# Patient Record
Sex: MALE | Race: AMERICAN INDIAN OR ALASKA NATIVE
[De-identification: names, ages, dates, MRNs, and addresses within clinical notes are randomized per-mention and may not be internally consistent; named-entity substitution may affect disease eponyms.]

---

## 2019-06-30 ENCOUNTER — HOSPITAL ENCOUNTER (INPATIENT)
Dept: HOSPITAL 5 - ED | Age: 24
LOS: 3 days | Discharge: PSYCHIATRIC OR SUBSTANCE USE FACILITY | DRG: 917 | End: 2019-07-03
Attending: INTERNAL MEDICINE | Admitting: INTERNAL MEDICINE
Payer: COMMERCIAL

## 2019-06-30 DIAGNOSIS — T45.0X2A: Primary | ICD-10-CM

## 2019-06-30 DIAGNOSIS — G92: ICD-10-CM

## 2019-06-30 DIAGNOSIS — J96.01: ICD-10-CM

## 2019-06-30 DIAGNOSIS — Z88.8: ICD-10-CM

## 2019-06-30 DIAGNOSIS — E87.6: ICD-10-CM

## 2019-06-30 DIAGNOSIS — Z87.891: ICD-10-CM

## 2019-06-30 DIAGNOSIS — E87.2: ICD-10-CM

## 2019-06-30 DIAGNOSIS — T39.312A: ICD-10-CM

## 2019-06-30 DIAGNOSIS — Z91.14: ICD-10-CM

## 2019-06-30 DIAGNOSIS — F32.9: ICD-10-CM
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
## 2019-06-30 DIAGNOSIS — F15.90: ICD-10-CM

## 2019-06-30 DIAGNOSIS — F14.90: ICD-10-CM

## 2019-06-30 LAB
ALBUMIN SERPL-MCNC: 4.1 G/DL (ref 3.9–5)
ALT SERPL-CCNC: 19 UNITS/L (ref 7–56)
BACTERIA #/AREA URNS HPF: (no result) /HPF
BASOPHILS # (AUTO): 0 K/MM3 (ref 0–0.1)
BASOPHILS NFR BLD AUTO: 0.3 % (ref 0–1.8)
BENZODIAZEPINES SCREEN,URINE: (no result)
BILIRUB UR QL STRIP: (no result)
BLOOD UR QL VISUAL: (no result)
BUN SERPL-MCNC: 12 MG/DL (ref 9–20)
BUN/CREAT SERPL: 10 %
CALCIUM SERPL-MCNC: 8.6 MG/DL (ref 8.4–10.2)
EOSINOPHIL # BLD AUTO: 0.1 K/MM3 (ref 0–0.4)
EOSINOPHIL NFR BLD AUTO: 0.7 % (ref 0–4.3)
HCT VFR BLD CALC: 46.8 % (ref 35.5–45.6)
HEMOLYSIS INDEX: 138
HGB BLD-MCNC: 15.7 GM/DL (ref 11.8–15.2)
LYMPHOCYTES # BLD AUTO: 2.7 K/MM3 (ref 1.2–5.4)
LYMPHOCYTES NFR BLD AUTO: 35.2 % (ref 13.4–35)
MCHC RBC AUTO-ENTMCNC: 34 % (ref 32–34)
MCV RBC AUTO: 96 FL (ref 84–94)
METHADONE SCREEN,URINE: (no result)
MONOCYTES # (AUTO): 0.9 K/MM3 (ref 0–0.8)
MONOCYTES % (AUTO): 11.7 % (ref 0–7.3)
MUCOUS THREADS #/AREA URNS HPF: (no result) /HPF
OPIATE SCREEN,URINE: (no result)
PH UR STRIP: 6 [PH] (ref 5–7)
PLATELET # BLD: 202 K/MM3 (ref 140–440)
PROT UR STRIP-MCNC: >500 MG/DL
RBC # BLD AUTO: 4.89 M/MM3 (ref 3.65–5.03)
RBC #/AREA URNS HPF: 11 /HPF (ref 0–6)
UROBILINOGEN UR-MCNC: < 2 MG/DL (ref ?–2)
WBC #/AREA URNS HPF: 13 /HPF (ref 0–6)

## 2019-06-30 PROCEDURE — 5A1935Z RESPIRATORY VENTILATION, LESS THAN 24 CONSECUTIVE HOURS: ICD-10-PCS | Performed by: INTERNAL MEDICINE

## 2019-06-30 PROCEDURE — 80307 DRUG TEST PRSMV CHEM ANLYZR: CPT

## 2019-06-30 PROCEDURE — 94003 VENT MGMT INPAT SUBQ DAY: CPT

## 2019-06-30 PROCEDURE — 93010 ELECTROCARDIOGRAM REPORT: CPT

## 2019-06-30 PROCEDURE — 82140 ASSAY OF AMMONIA: CPT

## 2019-06-30 PROCEDURE — 94760 N-INVAS EAR/PLS OXIMETRY 1: CPT

## 2019-06-30 PROCEDURE — 36415 COLL VENOUS BLD VENIPUNCTURE: CPT

## 2019-06-30 PROCEDURE — 0BH17EZ INSERTION OF ENDOTRACHEAL AIRWAY INTO TRACHEA, VIA NATURAL OR ARTIFICIAL OPENING: ICD-10-PCS | Performed by: EMERGENCY MEDICINE

## 2019-06-30 PROCEDURE — 80320 DRUG SCREEN QUANTALCOHOLS: CPT

## 2019-06-30 PROCEDURE — 96360 HYDRATION IV INFUSION INIT: CPT

## 2019-06-30 PROCEDURE — 83735 ASSAY OF MAGNESIUM: CPT

## 2019-06-30 PROCEDURE — 36600 WITHDRAWAL OF ARTERIAL BLOOD: CPT

## 2019-06-30 PROCEDURE — G0480 DRUG TEST DEF 1-7 CLASSES: HCPCS

## 2019-06-30 PROCEDURE — 93005 ELECTROCARDIOGRAM TRACING: CPT

## 2019-06-30 PROCEDURE — 87070 CULTURE OTHR SPECIMN AEROBIC: CPT

## 2019-06-30 PROCEDURE — 71045 X-RAY EXAM CHEST 1 VIEW: CPT

## 2019-06-30 PROCEDURE — 81001 URINALYSIS AUTO W/SCOPE: CPT

## 2019-06-30 PROCEDURE — 87205 SMEAR GRAM STAIN: CPT

## 2019-06-30 PROCEDURE — 70450 CT HEAD/BRAIN W/O DYE: CPT

## 2019-06-30 PROCEDURE — 82803 BLOOD GASES ANY COMBINATION: CPT

## 2019-06-30 PROCEDURE — 94002 VENT MGMT INPAT INIT DAY: CPT

## 2019-06-30 PROCEDURE — 74018 RADEX ABDOMEN 1 VIEW: CPT

## 2019-06-30 PROCEDURE — 80053 COMPREHEN METABOLIC PANEL: CPT

## 2019-06-30 PROCEDURE — 85025 COMPLETE CBC W/AUTO DIFF WBC: CPT

## 2019-06-30 PROCEDURE — 84443 ASSAY THYROID STIM HORMONE: CPT

## 2019-06-30 PROCEDURE — 87086 URINE CULTURE/COLONY COUNT: CPT

## 2019-06-30 RX ADMIN — SODIUM CHLORIDE SCH MLS/HR: 0.9 INJECTION, SOLUTION INTRAVENOUS at 20:40

## 2019-06-30 RX ADMIN — PROPOFOL SCH MLS/HR: 10 INJECTION, EMULSION INTRAVENOUS at 19:00

## 2019-06-30 NOTE — HISTORY AND PHYSICAL REPORT
History of Present Illness


Chief complaint: 





Unresponsive


History of present illness: 


25 YO Male with Depression, Bipolar, Medication Noncompliance presents to ED for

evaluation. Pt unresponsive, intubated, and on ventilatory support at time of 

evaluation and is unable to provide history. Pt history provided by his fiyao'.

As per ashli' they have been arguing continuously over the past 1 day. Th 

patient became angry and subsequently locked himself in the bedroom and was 

heard opening bottles of pills, and then opened the door after taking an entire 

bottle of diphenhydramine 50 mg tablets as well as unknown quantity of ibuprofen

200 mg tablets.  Pt then told the fianc "I will not be here".  I will not be a 

problem for you."  EMS notified, and upon arrival the patient was found to be in

distress and transported to [facility]. Pt seen and evaluated in ED and found to have 

Encephalopathy, and Acute Respiratory Failure, and is unable to protect his 

airway. Pt intubated and placed on vent support for Acute Respiratory Failure. 

Pt admitted to ICU. Poison control notified in ED. Pt inititated on IVF 

resuscitation therapy, with serial bmp to evaluated serum creatnine. No prior 

admission for review. All listed medication reconciled at time of admission. 





Past History


Past Medical History: other (Depression,bipolar Disorder)


Past Surgical History: No surgical history, Other (reviewed)


Social history: single, lives with family.  denies: smoking, alcohol abuse, 

prescription drug abuse


Family history: no significant family history (reviewed)





Medications and Allergies


                                    Allergies











Allergy/AdvReac Type Severity Reaction Status Date / Time


 


acetaminophen AdvReac  Rash Verified 04/04/17 17:36











                                Home Medications











 Medication  Instructions  Recorded  Confirmed  Last Taken  Type


 


Cyclobenzaprine [Flexeril] 10 mg PO TID PRN #20 tablet 04/04/17  Unknown Rx


 


Hydrocodone/Ibuprofen [Reprexain 1 each PO Q8H PRN #20 tablet 04/04/17  Unknown 

Rx





 mg Tablet]     


 


Naproxen [Naprosyn] 500 mg PO BID 04/04/17 04/04/17 04/03/17 19:00 History











Active Meds: 


Active Medications





Hydrophilic Ointment (Vaseline Lip Therapy)  1 applic TP Q2HR PRN


   PRN Reason: Dry Lips


Sodium Chloride (Nacl 0.9% 1000 Ml)  1,000 mls @ 999 mls/hr IV BOLUS ONE


   Stop: 06/30/19 19:52


Sodium Chloride (Nacl 0.9% 1000 Ml)  1,000 mls @ 999 mls/hr IV BOLUS ONE


   Stop: 06/30/19 19:52


Propofol (Diprivan 10 Mg/Ml)  1,000 mg in 100 mls @ 0 mls/hr IV TITR JOSE; 

Protocol


Multi-Ingred Cream/Lotion/Oil/Oint (Artificial Tears Ophth Oint)  1 applic OU 

Q4HR PRN


   PRN Reason: Dry Eye(s)











Review of Systems


ROS unobtainable: due to endotracheal tube





Exam





- Constitutional


General appearance: Present: severe distress





- EENT


Eyes: Present: PERRL, miosis


ENT: hearing intact, clear oral mucosa





- Neck


Neck: Present: supple, normal ROM





- Respiratory


Respiratory effort: normal


Respiratory: bilateral: diminished





- Cardiovascular


Heart Sounds: Present: S1 & S2.  Absent: rub, click





- Extremities


Extremities: pulses symmetrical, No edema


Peripheral Pulses: within normal limits





- Abdominal


General gastrointestinal: Present: soft, non-tender, non-distended, normal bowel

 sounds


Male genitourinary: Present: normal





- Integumentary


Integumentary: Present: clear, warm, dry





- Musculoskeletal


Musculoskeletal: generalized weakness





- Psychiatric


Psychiatric: no appropriate mood/affect, no intact judgment & insight, no memory

 intact





- Neurologic


Neurologic: CNII-XII intact, moves all extremities, no gait normal





Results





- Labs


CBC & Chem 7: 


                                 06/30/19 18:40





                                 06/30/19 18:40


Labs: 


                              Abnormal lab results











  06/30/19 Range/Units





  18:40 


 


Hgb  15.7 H  (11.8-15.2)  gm/dl


 


Hct  46.8 H  (35.5-45.6)  %


 


MCV  96 H  (84-94)  fl


 


Lymph % (Auto)  35.2 H  (13.4-35.0)  %


 


Mono % (Auto)  11.7 H  (0.0-7.3)  %


 


Mono #  0.9 H  (0.0-0.8)  K/mm3














Assessment and Plan





- Patient Problems


(1) Respiratory failure


Status: Acute   


Qualifiers: 


   Chronicity: acute   Respiratory failure complication: hypoxia   Qualified C

ode(s): J96.01 - Acute respiratory failure with hypoxia   


Plan to address problem: 


Admit to ICU, Pulmonary consulted, wean vent as tolerated, supportive care, SBT 

in AM, sedation holiday, 





The high probability of a clinically significant, sudden or life threatening 

deterioration of the [pulmonary, cardiac, renal] system(s) required my full and 

direct attention, intervention and personal management. The aggregate critical 

care time was [65] minutes. This time is in addition to time spent performing 

reported procedures but includes the following: Additional 30 minutes spent 

discussing care plan with ashli'. 





[x] Data Review and interpretation 





[x] Patient assessment and monitoring of vital signs 





[x] Documentation 





[x] Medication orders and management








(2) Encephalopathy


Status: Acute   


Plan to address problem: 


CT head, Neuro checks, IVF resuscitation therapy. 








(3) Suicide attempt, initial encounter


Status: Acute   


Plan to address problem: 


mental health consult when awake, alert, 








(4) DVT prophylaxis


Status: Acute   


Plan to address problem: 


SCD to BLE while in bed, prophylactic lovenox

## 2019-06-30 NOTE — EMERGENCY DEPARTMENT REPORT
History of Present Illness





- General


Stated Complaint: OVERDOSE ON PAIN PILLS


Time Seen by Provider: 06/30/19 18:34





- History of Present Illness


Initial Comments: 





Peewee is a 23 yo male with history of depression and bipolar affective disorder

presents with altered mental status after intentional drug overdose.  He has 

been arguing his live-in swapna for the past day.  Today he locked himself in 

the room.  She heard him taking pills.  He opened a door after taking an entire 

bottle of diphenhydramine 50 mg tablets as well as the majority of a bottle 

ibuprofen 200 mg.  He told the ficyrus "I will not be here.  I will not be a 

problem for you."  Christine states that he has never attempted suicide.  He has 

spoken of suicide in the past.  He has good employment.  He Has been with his 

ashlie for 12 years since 7th grade.


MD Complaint: intentional overdose


-: Sudden, This afternoon (approximately 5 PM)


Intent: suicide attempt


How Overdose Was Discovered: family/friend present


Context: Intentional Overdose: relationship problems


Associated Symptoms: depression


Treatments Prior to Arrival: oxygen





- Related Data


                                Home Medications











 Medication  Instructions  Recorded  Confirmed  Last Taken


 


Naproxen [Naprosyn] 500 mg PO BID 04/04/17 04/04/17 04/03/17 19:00








                                  Previous Rx's











 Medication  Instructions  Recorded  Last Taken  Type


 


Cyclobenzaprine [Flexeril] 10 mg PO TID PRN #20 tablet 04/04/17 Unknown Rx


 


Hydrocodone/Ibuprofen [Reprexain 1 each PO Q8H PRN #20 tablet 04/04/17 Unknown 

Rx





 mg Tablet]    











                                    Allergies











Allergy/AdvReac Type Severity Reaction Status Date / Time


 


acetaminophen AdvReac  Rash Verified 04/04/17 17:36














ED Review of Systems


ROS: 


Stated complaint: OVERDOSE ON PAIN PILLS


Other details as noted in HPI





Comment: Unobtainable due to pts medical conditions (altered mental status 

obtunded)





ED Past Medical Hx





- Past Medical History


Previous Medical History?: Yes


Additional medical history: Depression bipolar disorder





- Surgical History


Additional Surgical History: Unknown





- Social History


Smoking Status: Former Smoker


Substance Use Type: None


Other Social History: 





Works as a city worker, drives street sweeping trucks





- Medications


Home Medications: 


                                Home Medications











 Medication  Instructions  Recorded  Confirmed  Last Taken  Type


 


Cyclobenzaprine [Flexeril] 10 mg PO TID PRN #20 tablet 04/04/17  Unknown Rx


 


Hydrocodone/Ibuprofen [Reprexain 1 each PO Q8H PRN #20 tablet 04/04/17  Unknown 

Rx





 mg Tablet]     


 


Naproxen [Naprosyn] 500 mg PO BID 04/04/17 04/04/17 04/03/17 19:00 History














ED Physical Exam





- General


Limitations: Altered Mental Status


General appearance: obtunded, other (GCS of 3, limp extremities, no response to 

noxious stimuli no response to sternal rub)





- Head


Head exam: Present: atraumatic, normocephalic





- Eye


Eye exam: Absent: scleral icterus, conjunctival injection


Pupils: Present: other (3 mm sluggish pupils, nystagmus)





- ENT


ENT exam: Present: mucous membranes dry





- Neck


Neck exam: Present: normal inspection





- Respiratory


Respiratory exam: Present: other (decreased respiratory effort).  Absent: 

wheezes, rales, rhonchi





- Cardiovascular


Cardiovascular Exam: Present: normal rhythm, tachycardia, normal heart sounds.  

Absent: systolic murmur, diastolic murmur





- GI/Abdominal


GI/Abdominal exam: Present: soft.  Absent: distended, tenderness, guarding





- Extremities Exam


Extremities exam: Present: normal inspection, other (no obvious deformity)





- Back Exam


Back exam: Present: normal inspection





- Neurological Exam


Neurological exam: Present: other (obtunded no purposeful movement limp 

extremities GCS of 3)





- Psychiatric


Psychiatric exam: Present: other (obtunded)





- Skin


Skin exam: Present: warm, intact, normal color





ED Course


                                   Vital Signs











  06/30/19 06/30/19 06/30/19





  18:30 18:43 18:45


 


Temperature 98.7 F  


 


Pulse Rate 112 H  110 H


 


Respiratory 30 H 24 24





Rate   


 


Blood Pressure 140/97  


 


Blood Pressure 140/97  154/99





[Right]   


 


O2 Sat by Pulse 100  100





Oximetry   














  06/30/19 06/30/19 06/30/19





  19:00 19:15 20:00


 


Temperature   


 


Pulse Rate 110 H 113 H 


 


Respiratory 24 24 21





Rate   


 


Blood Pressure  139/88 


 


Blood Pressure 166/107 140/97 





[Right]   


 


O2 Sat by Pulse 100 100 





Oximetry   














- Intubation


Time Out Performed: Yes


Sedative: Etomidate


Mg Given: 10


Paralytic: Succinylcholine


Mg Given: 120


Laryngoscope: Genevieve


Size: 4


ET Tube Size: 8


Tube Secured Depth (cm): 22


Tube Secured Location: lips


Tube Placement Confirmation: visualized tube passing t, equal breath sounds 

bilat, no breath sounds over epi, confirmation by capnometr


Patient Tolerated Procedure: well


Intubation Complications: none





ED Medical Decision Making





- Lab Data


Result diagrams: 


                                 06/30/19 18:40





                                 06/30/19 18:40





- EKG Data





06/30/19 19:35


EKG obtained 1925


Sinus tachycardia rate 105 beats a minute rightward axis prolonged QT interval 

QTc 513 ms nonspecific T wave ST pattern





- Radiology Data


Radiology results: report reviewed





Chest x-ray no acute process





- Medical Decision Making





Peewee presents with severe diphenhydramine and ibuprofen overdose intentional 

suicide attempt during argument with girlfriend.  I intubated patient upon 

arrival due to severe respiratory depression, GCS of 3.  No purposeful movement.

  No response to noxious stimuli.  Spoke with fianc and paramedics.  I 

immediately consulted [place] poison control.  Recommended supportive care as 

well as ABG, lactic acid.  Concern for renal consult with ibuprofen overdose.





Admitted to the hospital service in fair condition to ICU, intubated on 

mechanical ventilation





1013 involuntary hold instituted.





Labs notable for positive UDS cocaine and amphetamines


Critical Care Time: Yes


Critical care attestation.: 


If time is entered above; I have spent that time in minutes in the direct care 

of this critically ill patient, excluding procedure time.


40 minutes of critical care time excluding procedures were used in the care of 

the patient.  Patient required multiple assessments and interventions.  I 

reviewed the electronic medical record.  I spoke with consultants involved in 

the care of the patient.





ED Disposition


Clinical Impression: 


 Acute respiratory failure, Acute metabolic encephalopathy, Intentional drug 

overdose, Suicide attempt, History of bipolar disorder





Disposition: DC-09 OP ADMIT IP TO THIS HOSP


Is pt being admited?: Yes


Does the pt Need Aspirin: No


Condition: Stable

## 2019-06-30 NOTE — XRAY REPORT
PROCEDURE: XR CHEST 1V AP 

 

TECHNIQUE:  Chest radiograph single view.  

 

HISTORY: ETT placement 

 

COMPARISONS: None . 

 

FINDINGS: 

 

Heart: Normal. 

Mediastinum/Vessels: Normal. 

Lungs/Pleural space:  Normal. 

Bony thorax: No acute osseous abnormality. 

Life support devices: ET tube present tip is approximately midway between the thoracic inlet and the 
maria eugenia 

 

IMPRESSION:  No acute cardiopulmonary abnormality. 

 

This document is electronically signed by Naveen Mccollum MD., June 30 2019 07:20:01 PM ET

## 2019-06-30 NOTE — CAT SCAN REPORT
PROCEDURE:  CT HEAD/BRAIN WO CON 

 

TECHNIQUE:  Computerized tomography of the head was performed without contrast material.  

 

 

 

HISTORY: confusion   od 

 

COMPARISONS:  None . 

 

FINDINGS: There is some motion artifact which does degrade image quality. 

 

Brain:  Brain density appears normal.  No evidence of intracranial hemorrhage.  No parenchymal hemorr
chon, mass lesions or mass effect are seen.  No abnormal extra-axial fluid collects or masses are see
n. 

 

Ventricles:  Ventricles are normal size and are midline. 

 

Bone Windows: No evidence of skull fracture. 

 

Paranasal sinuses: Visualized portions are clear.. 

 

Mastoid air cells: Clear. 

 

IMPRESSION: 

 

Negative unenhanced CT of the brain. No acute or focal intracranial abnormalities are identified. . 

 

This document is electronically signed by Ricardo Wallace MD., June 30 2019 09:40:36 PM ET

## 2019-07-01 LAB
ALBUMIN SERPL-MCNC: 3.5 G/DL (ref 3.9–5)
ALT SERPL-CCNC: 14 UNITS/L (ref 7–56)
BASOPHILS # (AUTO): 0 K/MM3 (ref 0–0.1)
BASOPHILS NFR BLD AUTO: 0.2 % (ref 0–1.8)
BUN SERPL-MCNC: 9 MG/DL (ref 9–20)
BUN/CREAT SERPL: 8 %
CALCIUM SERPL-MCNC: 8.2 MG/DL (ref 8.4–10.2)
EOSINOPHIL # BLD AUTO: 0 K/MM3 (ref 0–0.4)
EOSINOPHIL NFR BLD AUTO: 0.5 % (ref 0–4.3)
HCT VFR BLD CALC: 38.2 % (ref 35.5–45.6)
HEMOLYSIS INDEX: 12
HGB BLD-MCNC: 13.3 GM/DL (ref 11.8–15.2)
LYMPHOCYTES # BLD AUTO: 2.2 K/MM3 (ref 1.2–5.4)
LYMPHOCYTES NFR BLD AUTO: 31.5 % (ref 13.4–35)
MCHC RBC AUTO-ENTMCNC: 35 % (ref 32–34)
MCV RBC AUTO: 93 FL (ref 84–94)
MONOCYTES # (AUTO): 0.6 K/MM3 (ref 0–0.8)
MONOCYTES % (AUTO): 8.6 % (ref 0–7.3)
PLATELET # BLD: 161 K/MM3 (ref 140–440)
RBC # BLD AUTO: 4.1 M/MM3 (ref 3.65–5.03)

## 2019-07-01 PROCEDURE — 4A033R1 MEASUREMENT OF ARTERIAL SATURATION, PERIPHERAL, PERCUTANEOUS APPROACH: ICD-10-PCS | Performed by: INTERNAL MEDICINE

## 2019-07-01 RX ADMIN — Medication SCH ML: at 12:02

## 2019-07-01 RX ADMIN — SODIUM CHLORIDE SCH MLS/HR: 0.9 INJECTION, SOLUTION INTRAVENOUS at 12:01

## 2019-07-01 RX ADMIN — PROPOFOL SCH MLS/HR: 10 INJECTION, EMULSION INTRAVENOUS at 00:30

## 2019-07-01 RX ADMIN — PROPOFOL SCH MLS/HR: 10 INJECTION, EMULSION INTRAVENOUS at 06:30

## 2019-07-01 RX ADMIN — Medication SCH ML: at 00:00

## 2019-07-01 RX ADMIN — SODIUM CHLORIDE SCH MLS/HR: 0.9 INJECTION, SOLUTION INTRAVENOUS at 20:02

## 2019-07-01 RX ADMIN — PROPOFOL SCH MLS/HR: 10 INJECTION, EMULSION INTRAVENOUS at 02:48

## 2019-07-01 RX ADMIN — SODIUM CHLORIDE SCH MLS/HR: 0.9 INJECTION, SOLUTION INTRAVENOUS at 02:52

## 2019-07-01 RX ADMIN — Medication SCH ML: at 22:05

## 2019-07-01 RX ADMIN — ENOXAPARIN SODIUM SCH MG: 100 INJECTION SUBCUTANEOUS at 22:05

## 2019-07-01 NOTE — CONSULTATION
History of Present Illness





- Reason for Consult


Consult date: 07/01/19


Reason for consult: Mental Health Evaluation


Requesting physician: HUONG DIAZ





- Chief Complaint


Chief complaint: 


"The patient is intubated'











- History of Present Psychiatric Illness


24 y.o. AA male who presented to the ER for overdosing on pills. Today the 

patient is intubated. Per collateral information from the patient's fiancee 

Harsha Seaman who was at the bedside, she stated that they got into an 

argument yesterday and the patient took several "sleeping pills." She stated 

that the patient endorse SI's often when they argue. She stated that the patient

has a hx of substance abuse. .  








Medications and Allergies


                                    Allergies











Allergy/AdvReac Type Severity Reaction Status Date / Time


 


acetaminophen AdvReac  Rash Verified 04/04/17 17:36











                                Home Medications











 Medication  Instructions  Recorded  Confirmed  Last Taken  Type


 


Cyclobenzaprine [Flexeril] 10 mg PO TID PRN #20 tablet 04/04/17  Unknown Rx


 


Hydrocodone/Ibuprofen [Reprexain 1 each PO Q8H PRN #20 tablet 04/04/17  Unknown 

Rx





 mg Tablet]     


 


Naproxen [Naprosyn] 500 mg PO BID 04/04/17 04/04/17 04/03/17 19:00 History











Active Meds: 


Active Medications





Enoxaparin Sodium (Lovenox)  40 mg SUB-Q QDAY@2200 JOSE


Hydrophilic Ointment (Vaseline Lip Therapy)  1 applic TP Q2HR PRN


   PRN Reason: Dry Lips


Sodium Chloride (Nacl 0.9% 1000 Ml)  1,000 mls @ 125 mls/hr IV AS DIRECT JOSE


   Last Admin: 07/01/19 02:52 Dose:  125 mls/hr


   Documented by: 


Midazolam HCl 100 mg/ Sodium (Chloride)  100 mls @ 2 mls/hr IV TITR JOSE; 

Protocol


   Last Titration: 07/01/19 00:30 Dose:  50 mg/hr, 50 mls/hr


   Documented by: 


Propofol (Diprivan 10 Mg/Ml)  1,000 mg in 100 mls @ 2.436 mls/hr IV TITR JOSE; 

Protocol


   Last Admin: 07/01/19 06:30 Dose:  45 mcg/kg/min, 21.924 mls/hr


   Documented by: 


Midazolam HCl (Versed)  2 mg IV Q10MIN PRN


   PRN Reason: Sedation


Multi-Ingred Cream/Lotion/Oil/Oint (Artificial Tears Ophth Oint)  1 applic OU 

Q4HR PRN


   PRN Reason: Dry Eye(s)


Sodium Chloride (Sodium Chloride Flush Syringe 10 Ml)  10 ml IV BID JOSE


   Last Admin: 07/01/19 00:00 Dose:  10 ml


   Documented by: 


Sodium Chloride (Sodium Chloride Flush Syringe 10 Ml)  10 ml IV PRN PRN


   PRN Reason: LINE FLUSH











Past psychiatric history





- Past Medical History


Past Medical History: other (Unable to obatin )


Past Surgical History: Other (unable to obtain )





- past Psychiatric treatment and history


psychiatric treatment history: 


per the patient's fiancee Marisol Seaman, she stated that the patient SI's 

often when they argue. Unable to obatin a fam psy hx. 








- Social History


Social history: lives with family





Mental Status Exam





- Vital signs


                                Last Vital Signs











Temp  97.4 F L  07/01/19 08:00


 


Pulse  65   07/01/19 08:30


 


Resp  13   07/01/19 08:30


 


BP  120/79   07/01/19 08:30


 


Pulse Ox  100   07/01/19 08:21














- Exam


Narrative exam: 


Unable to complete because of the patient's condition. 








Results


Result Diagrams: 


                                 07/01/19 05:10





                                 07/01/19 05:10


                              Abnormal lab results











  06/30/19 06/30/19 06/30/19 Range/Units





  18:40 18:40 18:40 


 


Hgb  15.7 H    (11.8-15.2)  gm/dl


 


Hct  46.8 H    (35.5-45.6)  %


 


MCV  96 H    (84-94)  fl


 


MCH     (28-32)  pg


 


MCHC     (32-34)  %


 


Lymph % (Auto)  35.2 H    (13.4-35.0)  %


 


Mono % (Auto)  11.7 H    (0.0-7.3)  %


 


Mono #  0.9 H    (0.0-0.8)  K/mm3


 


POC ABG pH     (7.35-7.45)  


 


POC ABG pCO2     (35-45)  


 


POC ABG pO2     ()  


 


Potassium     (3.6-5.0)  mmol/L


 


Chloride     ()  mmol/L


 


Carbon Dioxide     (22-30)  mmol/L


 


Glucose     ()  mg/dL


 


Lactic Acid     (0.7-2.0)  mmol/L


 


Calcium     (8.4-10.2)  mg/dL


 


Total Bilirubin   1.30 H   (0.1-1.2)  mg/dL


 


AST   42 H   (5-40)  units/L


 


Total Protein     (6.3-8.2)  g/dL


 


Albumin     (3.9-5)  g/dL


 


Urine WBC (Auto)     (0.0-6.0)  /HPF


 


Salicylates    < 0.3 L  (2.8-20.0)  mg/dL


 


Acetaminophen     (10.0-30.0)  ug/mL














  06/30/19 06/30/19 06/30/19 Range/Units





  18:40 18:40 19:30 


 


Hgb     (11.8-15.2)  gm/dl


 


Hct     (35.5-45.6)  %


 


MCV     (84-94)  fl


 


MCH     (28-32)  pg


 


MCHC     (32-34)  %


 


Lymph % (Auto)     (13.4-35.0)  %


 


Mono % (Auto)     (0.0-7.3)  %


 


Mono #     (0.0-0.8)  K/mm3


 


POC ABG pH     (7.35-7.45)  


 


POC ABG pCO2     (35-45)  


 


POC ABG pO2     ()  


 


Potassium     (3.6-5.0)  mmol/L


 


Chloride     ()  mmol/L


 


Carbon Dioxide     (22-30)  mmol/L


 


Glucose     ()  mg/dL


 


Lactic Acid   2.30 H*   (0.7-2.0)  mmol/L


 


Calcium     (8.4-10.2)  mg/dL


 


Total Bilirubin     (0.1-1.2)  mg/dL


 


AST     (5-40)  units/L


 


Total Protein     (6.3-8.2)  g/dL


 


Albumin     (3.9-5)  g/dL


 


Urine WBC (Auto)    13.0 H  (0.0-6.0)  /HPF


 


Salicylates     (2.8-20.0)  mg/dL


 


Acetaminophen  < 5.0 L    (10.0-30.0)  ug/mL














  06/30/19 07/01/19 07/01/19 Range/Units





  20:20 05:01 05:10 


 


Hgb     (11.8-15.2)  gm/dl


 


Hct     (35.5-45.6)  %


 


MCV     (84-94)  fl


 


MCH    33 H  (28-32)  pg


 


MCHC    35 H  (32-34)  %


 


Lymph % (Auto)     (13.4-35.0)  %


 


Mono % (Auto)    8.6 H  (0.0-7.3)  %


 


Mono #     (0.0-0.8)  K/mm3


 


POC ABG pH   7.458 H   (7.35-7.45)  


 


POC ABG pCO2   31.3 L   (35-45)  


 


POC ABG pO2   216 H   ()  


 


Potassium     (3.6-5.0)  mmol/L


 


Chloride     ()  mmol/L


 


Carbon Dioxide     (22-30)  mmol/L


 


Glucose     ()  mg/dL


 


Lactic Acid  2.50 H*    (0.7-2.0)  mmol/L


 


Calcium     (8.4-10.2)  mg/dL


 


Total Bilirubin     (0.1-1.2)  mg/dL


 


AST     (5-40)  units/L


 


Total Protein     (6.3-8.2)  g/dL


 


Albumin     (3.9-5)  g/dL


 


Urine WBC (Auto)     (0.0-6.0)  /HPF


 


Salicylates     (2.8-20.0)  mg/dL


 


Acetaminophen     (10.0-30.0)  ug/mL














  07/01/19 Range/Units





  05:10 


 


Hgb   (11.8-15.2)  gm/dl


 


Hct   (35.5-45.6)  %


 


MCV   (84-94)  fl


 


MCH   (28-32)  pg


 


MCHC   (32-34)  %


 


Lymph % (Auto)   (13.4-35.0)  %


 


Mono % (Auto)   (0.0-7.3)  %


 


Mono #   (0.0-0.8)  K/mm3


 


POC ABG pH   (7.35-7.45)  


 


POC ABG pCO2   (35-45)  


 


POC ABG pO2   ()  


 


Potassium  3.5 L  (3.6-5.0)  mmol/L


 


Chloride  109.1 H  ()  mmol/L


 


Carbon Dioxide  20 L  (22-30)  mmol/L


 


Glucose  64 L  ()  mg/dL


 


Lactic Acid   (0.7-2.0)  mmol/L


 


Calcium  8.2 L  (8.4-10.2)  mg/dL


 


Total Bilirubin   (0.1-1.2)  mg/dL


 


AST   (5-40)  units/L


 


Total Protein  5.7 L D  (6.3-8.2)  g/dL


 


Albumin  3.5 L  (3.9-5)  g/dL


 


Urine WBC (Auto)   (0.0-6.0)  /HPF


 


Salicylates   (2.8-20.0)  mg/dL


 


Acetaminophen   (10.0-30.0)  ug/mL








All other labs normal.








Assessment and Plan


Assessment and plan: 


Impression. Today the patient is intubated. Positive for amphetamines and 

cocaine.





Recommendation/Plan: Continue 1013. Psy sign off,. Reconsult once the patient is

 extubated. 





Will staff with Dr ADINA Monaco.

## 2019-07-01 NOTE — CONSULTATION
History of Present Illness


Consult date: 07/01/19


Requesting physician: VIANEY KILLIAN


Reason for consult: other (Acute Hypoxemic Respiratory Failure)


History of present illness: 





PULMONARY/CCM CONSULT NOTE (Full dictation # [id number])





Please see dictated notes for full details





Past History


Past Medical History: other (Unable to obatin )


Past Surgical History: Other (unable to obtain )


Social history: lives with family


Family history: no significant family history (reviewed)





Medications and Allergies


                                    Allergies











Allergy/AdvReac Type Severity Reaction Status Date / Time


 


acetaminophen AdvReac  Rash Verified 04/04/17 17:36











                                Home Medications











 Medication  Instructions  Recorded  Confirmed  Last Taken  Type


 


Cyclobenzaprine [Flexeril] 10 mg PO TID PRN #20 tablet 04/04/17  Unknown Rx


 


Hydrocodone/Ibuprofen [Reprexain 1 each PO Q8H PRN #20 tablet 04/04/17  Unknown 

Rx





 mg Tablet]     


 


Naproxen [Naprosyn] 500 mg PO BID 04/04/17 04/04/17 04/03/17 19:00 History











Active Meds: 


Active Medications





Enoxaparin Sodium (Lovenox)  40 mg SUB-Q QDAY@2200 JOSE


Hydrophilic Ointment (Vaseline Lip Therapy)  1 applic TP Q2HR PRN


   PRN Reason: Dry Lips


Sodium Chloride (Nacl 0.9% 1000 Ml)  1,000 mls @ 125 mls/hr IV AS DIRECT JOSE


   Last Admin: 07/01/19 02:52 Dose:  125 mls/hr


   Documented by: 


Midazolam HCl 100 mg/ Sodium (Chloride)  100 mls @ 2 mls/hr IV TITR JOSE; 

Protocol


   Last Titration: 07/01/19 09:01 Dose:  Infused


   Documented by: 


Propofol (Diprivan 10 Mg/Ml)  1,000 mg in 100 mls @ 2.436 mls/hr IV TITR JOSE; 

Protocol


   Last Titration: 07/01/19 09:08 Dose:  0 mcg/kg/min, 0 mls/hr


   Documented by: 


Midazolam HCl (Versed)  2 mg IV Q10MIN PRN


   PRN Reason: Sedation


Multi-Ingred Cream/Lotion/Oil/Oint (Artificial Tears Ophth Oint)  1 applic OU 

Q4HR PRN


   PRN Reason: Dry Eye(s)


Sodium Chloride (Sodium Chloride Flush Syringe 10 Ml)  10 ml IV BID JOSE


   Last Admin: 07/01/19 00:00 Dose:  10 ml


   Documented by: 


Sodium Chloride (Sodium Chloride Flush Syringe 10 Ml)  10 ml IV PRN PRN


   PRN Reason: LINE FLUSH











Physical Examination


Vital signs: 


                                   Vital Signs











Temp Pulse Resp BP Pulse Ox


 


 98.9 F   112 H  30 H  140/97   97 


 


 06/30/19 18:30  06/30/19 18:30  06/30/19 18:30  06/30/19 18:30  06/30/19 18:30














Results





- Laboratory Findings


CBC and BMP: 


                                 07/01/19 05:10





                                 07/01/19 05:10


ABG











POC ABG pH  7.458  (7.35-7.45)  H  07/01/19  05:01    


 


POC ABG pCO2  31.3  (35-45)  L  07/01/19  05:01    


 


POC ABG pO2  216  ()  H  07/01/19  05:01    


 


POC ABG HCO3  22.1  (22-26 mml/L)   07/01/19  05:01    


 


POC ABG Total CO2  23  (23-27mmol/L)   07/01/19  05:01    


 


POC ABG O2 Sat  100   07/01/19  05:01    








Abnormal lab findings: 


                                  Abnormal Labs











  06/30/19 06/30/19 06/30/19





  18:40 18:40 18:40


 


Hgb  15.7 H  


 


Hct  46.8 H  


 


MCV  96 H  


 


MCH   


 


MCHC   


 


Lymph % (Auto)  35.2 H  


 


Mono % (Auto)  11.7 H  


 


Mono #  0.9 H  


 


POC ABG pH   


 


POC ABG pCO2   


 


POC ABG pO2   


 


Potassium   


 


Chloride   


 


Carbon Dioxide   


 


Glucose   


 


Lactic Acid   


 


Calcium   


 


Total Bilirubin   1.30 H 


 


AST   42 H 


 


Total Protein   


 


Albumin   


 


Urine WBC (Auto)   


 


Salicylates    < 0.3 L


 


Acetaminophen   














  06/30/19 06/30/19 06/30/19





  18:40 18:40 19:30


 


Hgb   


 


Hct   


 


MCV   


 


MCH   


 


MCHC   


 


Lymph % (Auto)   


 


Mono % (Auto)   


 


Mono #   


 


POC ABG pH   


 


POC ABG pCO2   


 


POC ABG pO2   


 


Potassium   


 


Chloride   


 


Carbon Dioxide   


 


Glucose   


 


Lactic Acid   2.30 H* 


 


Calcium   


 


Total Bilirubin   


 


AST   


 


Total Protein   


 


Albumin   


 


Urine WBC (Auto)    13.0 H


 


Salicylates   


 


Acetaminophen  < 5.0 L  














  06/30/19 07/01/19 07/01/19





  20:20 05:01 05:10


 


Hgb   


 


Hct   


 


MCV   


 


MCH    33 H


 


MCHC    35 H


 


Lymph % (Auto)   


 


Mono % (Auto)    8.6 H


 


Mono #   


 


POC ABG pH   7.458 H 


 


POC ABG pCO2   31.3 L 


 


POC ABG pO2   216 H 


 


Potassium   


 


Chloride   


 


Carbon Dioxide   


 


Glucose   


 


Lactic Acid  2.50 H*  


 


Calcium   


 


Total Bilirubin   


 


AST   


 


Total Protein   


 


Albumin   


 


Urine WBC (Auto)   


 


Salicylates   


 


Acetaminophen   














  07/01/19





  05:10


 


Hgb 


 


Hct 


 


MCV 


 


MCH 


 


MCHC 


 


Lymph % (Auto) 


 


Mono % (Auto) 


 


Mono # 


 


POC ABG pH 


 


POC ABG pCO2 


 


POC ABG pO2 


 


Potassium  3.5 L


 


Chloride  109.1 H


 


Carbon Dioxide  20 L


 


Glucose  64 L


 


Lactic Acid 


 


Calcium  8.2 L


 


Total Bilirubin 


 


AST 


 


Total Protein  5.7 L D


 


Albumin  3.5 L


 


Urine WBC (Auto) 


 


Salicylates 


 


Acetaminophen

## 2019-07-01 NOTE — PROGRESS NOTE
Assessment and Plan


Assessment and plan: 





23 YO Male with Depression, Bipolar, Medication Noncompliance presents to ED for

evaluation. Pt unresponsive, intubated, and on ventilatory support at time of 

evaluation and is unable to history. Pt history provided by his fiyao'. As per 

ashli' they have been arguing continuously over the past 1 day. The patient 

became angry and subsequently locked himself in the bedroom and was heard 

opening bottles of pills, and then opened the door after taking an entire bottle

of diphenhydramine 50 mg tablets as well as unknown quantity of ibuprofen 200 mg

tablets.  Pt then told the fianc "I will not be here".  I will not be a problem

for you."  EMS notified, and upon arrival the patient was found to be in 

distress and transported to [facility]. Pt seen and evaluated in ED and found to have 

Encephalopathy, and Acute Respiratory Failure, and is unable to protect his 

airway. Pt intubated and placed on vent support for Acute Respiratory Failure. 

Pt admitted to ICU. Poison control notified in ED. Pt inititated on IVF 

resuscitation therapy, with serial bmp to evaluated serum creatnine. No prior 

admission for review. All listed medication reconciled at time of admission. 





Respiratory failure


- Patient is intubated and on mechanical ventilation


Toxic encephalopathy


- Protect airway with mechanical ventilation


- IV fluids


Suicidal attempt


-We will put mental health evaluation


DVT & GI prophylaxis





The high probability of a clinically significant, sudden or life threatening 

deterioration of the [Neurology, respiratory] system(s) required my full and 

direct attention, intervention and personal management. The aggregate critical 

care time was [x] minutes. This time is in addition to time spent performing 

reported procedures but includes the following: 





[x] Data Review and interpretation 





[x] Patient assessment and monitoring of vital signs 





[x] Documentation 





[x] Medication orders and management





History


Interval history: 





Patient was seen and evaluated this morning, patient was intubated and on 

mechanical ventilation.





Hospitalist Physical





- Physical exam


Narrative exam: 





Intubated on mechanical ventilation


 The patient appeared well nourished and normally developed.


 Vital signs as documented.


 Head exam is unremarkable.


 No scleral icterus .


 Neck is without jugular venous distension, thyromegaly, or carotid bruits. 


 Lungs are clear to auscultation.


Cardiac exam reveals regular rate and  Rhythm. First and second heart sounds 

normal. No murmurs, rubs or gallops. 


Abdominal exam reveals normal bowel sounds, no masses, no organomegaly and no 

aortic enlargement. 


Extremities are nonedematous and both femoral and pedal pulses are normal.


CNS: patient is comatose.





- Constitutional


Vitals: 


                                        











Temp Pulse Resp BP Pulse Ox


 


 98.0 F   88   14   119/88   99 


 


 07/01/19 12:00  07/01/19 16:00  07/01/19 16:00  07/01/19 16:00  07/01/19 16:00











General appearance: Present: severe distress





Results





- Labs


CBC & Chem 7: 


                                 07/01/19 05:10





                                 07/01/19 05:10


Labs: 


                             Laboratory Last Values











WBC  7.0 K/mm3 (4.5-11.0)   07/01/19  05:10    


 


RBC  4.10 M/mm3 (3.65-5.03)   07/01/19  05:10    


 


Hgb  13.3 gm/dl (11.8-15.2)   07/01/19  05:10    


 


Hct  38.2 % (35.5-45.6)  D 07/01/19  05:10    


 


MCV  93 fl (84-94)   07/01/19  05:10    


 


MCH  33 pg (28-32)  H  07/01/19  05:10    


 


MCHC  35 % (32-34)  H  07/01/19  05:10    


 


RDW  13.7 % (13.2-15.2)   07/01/19  05:10    


 


Plt Count  161 K/mm3 (140-440)   07/01/19  05:10    


 


Lymph % (Auto)  31.5 % (13.4-35.0)   07/01/19  05:10    


 


Mono % (Auto)  8.6 % (0.0-7.3)  H  07/01/19  05:10    


 


Eos % (Auto)  0.5 % (0.0-4.3)   07/01/19  05:10    


 


Baso % (Auto)  0.2 % (0.0-1.8)   07/01/19  05:10    


 


Lymph #  2.2 K/mm3 (1.2-5.4)   07/01/19  05:10    


 


Mono #  0.6 K/mm3 (0.0-0.8)   07/01/19  05:10    


 


Eos #  0.0 K/mm3 (0.0-0.4)   07/01/19  05:10    


 


Baso #  0.0 K/mm3 (0.0-0.1)   07/01/19  05:10    


 


Seg Neutrophils %  59.2 % (40.0-70.0)   07/01/19  05:10    


 


Seg Neutrophils #  4.1 K/mm3 (1.8-7.7)   07/01/19  05:10    


 


POC ABG pH  7.339  (7.35-7.45)  L  07/01/19  12:52    


 


POC ABG pCO2  41.8  (35-45)   07/01/19  12:52    


 


POC ABG pO2  119  ()  H  07/01/19  12:52    


 


POC ABG HCO3  22.5  (22-26 mml/L)   07/01/19  12:52    


 


POC ABG Total CO2  24  (23-27mmol/L)   07/01/19  12:52    


 


POC ABG O2 Sat  98   07/01/19  12:52    


 


POC ABG Base Excess  -3  ((-2) - (+3)mmol/L)   07/01/19  12:52    


 


  28 %  07/01/19  12:52    


 


Sodium  142 mmol/L (137-145)   07/01/19  05:10    


 


Potassium  3.5 mmol/L (3.6-5.0)  L  07/01/19  05:10    


 


Chloride  109.1 mmol/L ()  H  07/01/19  05:10    


 


Carbon Dioxide  20 mmol/L (22-30)  L  07/01/19  05:10    


 


  16 mmol/L  07/01/19  05:10    


 


BUN  9 mg/dL (9-20)   07/01/19  05:10    


 


  1.1 mg/dL (0.8-1.5)   07/01/19  05:10    


 


Estimated GFR  > 60 ml/min  07/01/19  05:10    


 


  8 %  07/01/19  05:10    


 


Glucose  64 mg/dL ()  L  07/01/19  05:10    


 


Lactic Acid  0.90 mmol/L (0.7-2.0)   07/01/19  05:10    


 


Calcium  8.2 mg/dL (8.4-10.2)  L  07/01/19  05:10    


 


  1.00 mg/dL (0.1-1.2)   07/01/19  05:10    


 


AST  29 units/L (5-40)   07/01/19  05:10    


 


ALT  14 units/L (7-56)   07/01/19  05:10    


 


  57 units/L ()   07/01/19  05:10    


 


  5.7 g/dL (6.3-8.2)  L D 07/01/19  05:10    


 


  3.5 g/dL (3.9-5)  L  07/01/19  05:10    


 


  1.6 %  07/01/19  05:10    


 


TSH  0.298 mlU/mL (0.270-4.200)   06/30/19  18:40    


 


  Yellow  (Yellow)   06/30/19  19:30    


 


  Slightly-cloudy  (Clear)   06/30/19  19:30    


 


  6.0  (5.0-7.0)   06/30/19  19:30    


 


Ur Specific Gravity  1.027  (1.003-1.030)   06/30/19  19:30    


 


  >500 mg/dL (Negative)   06/30/19  19:30    


 


  50 mg/dL (Negative)   06/30/19  19:30    


 


  20 mg/dL (Negative)   06/30/19  19:30    


 


  Neg  (Negative)   06/30/19  19:30    


 


  Neg  (Negative)   06/30/19  19:30    


 


  Neg  (Negative)   06/30/19  19:30    


 


  < 2.0 mg/dL (<2.0)   06/30/19  19:30    


 


Ur Leukocyte Esterase  Neg  (Negative)   06/30/19  19:30    


 


  13.0 /HPF (0.0-6.0)  H  06/30/19  19:30    


 


  11.0 /HPF (0.0-6.0)   06/30/19  19:30    


 


U Epithel Cells (Auto)  2.0 /HPF (0-13.0)   06/30/19  19:30    


 


  1+ /HPF (Negative)   06/30/19  19:30    


 


  1+ /HPF  06/30/19  19:30    


 


Salicylates  < 0.3 mg/dL (2.8-20.0)  L  06/30/19  18:40    


 


  Presumptive negative   06/30/19  19:30    


 


  Presumptive negative   06/30/19  19:30    


 


Acetaminophen  < 5.0 ug/mL (10.0-30.0)  L  06/30/19  18:40    


 


Ur Barbiturates Screen  Presumptive negative   06/30/19  19:30    


 


Ur Phencyclidine Scrn  Presumptive negative   06/30/19  19:30    


 


Ur Amphetamines Screen  Presumptive positive   06/30/19  19:30    


 


U Benzodiazepines Scrn  Presumptive negative   06/30/19  19:30    


 


  Presumptive positive   06/30/19  19:30    


 


U Marijuana (THC) Screen  Presumptive negative   06/30/19  19:30    


 


  Disclamer   06/30/19  19:30    


 


Plasma/Serum Alcohol  < 0.01 % (0-0.07)   06/30/19  18:40    














Active Medications





- Current Medications


Current Medications: 














Generic Name Dose Route Start Last Admin





  Trade Name Freq  PRN Reason Stop Dose Admin


 


Benzocaine/Menthol  1 each  07/01/19 14:32 





  Cepacol X Strength  MM  





  Q2H PRN  





  Sore Throat  


 


Enoxaparin Sodium  40 mg  07/01/19 22:00 





  Lovenox  SUB-Q  





  QDAY@2200 JOSE  


 


Hydrophilic Ointment  1 applic  06/30/19 18:34 





  Vaseline Lip Therapy  TP  





  Q2HR PRN  





  Dry Lips  


 


Sodium Chloride  1,000 mls @ 125 mls/hr  06/30/19 20:00  07/01/19 12:01





  Nacl 0.9% 1000 Ml  IV   125 mls/hr





  AS DIRECT JOSE   Administration


 


Midazolam HCl 100 mg/ Sodium  100 mls @ 2 mls/hr  06/30/19 20:00  07/01/19 09:01





  Chloride  IV   Infused





  TITR JOSE   Titration





  Protocol  





  2 MG/HR  


 


Propofol  1,000 mg in 100 mls @ 2.436 mls/hr  06/30/19 19:00  07/01/19 14:01





  Diprivan 10 Mg/Ml  IV   0 mcg/kg/min





  TITR JOSE   0 mls/hr





    Titration





  Protocol  





  5 MCG/KG/MIN  


 


Midazolam HCl  2 mg  06/30/19 19:29 





  Versed  IV  





  Q10MIN PRN  





  Sedation  


 


Multi-Ingred Cream/Lotion/Oil/Oint  1 applic  06/30/19 18:34 





  Artificial Tears Ophth Oint  OU  





  Q4HR PRN  





  Dry Eye(s)  


 


Sodium Chloride  10 ml  06/30/19 22:00  07/01/19 12:02





  Sodium Chloride Flush Syringe 10 Ml  IV   10 ml





  BID JOSE   Administration


 


Sodium Chloride  10 ml  06/30/19 19:17 





  Sodium Chloride Flush Syringe 10 Ml  IV  





  PRN PRN  





  LINE FLUSH  














Nutrition/Malnutrition Assess





- Dietary Evaluation


Nutrition/Malnutrition Findings: 


                                        





Nutrition Notes                                            Start:  07/01/19 

14:28


Freq:                                                      Status: Active       

 


Protocol:                                                                       

 


 Document     07/01/19 14:28  RM  (Rec: 07/01/19 14:36  RM  [id number])


 Nutrition Notes


     Need for Assessment generated from:         MD Order


     Initial or Follow up                        Assessment


     Other Pertinent Diagnosis                   Depression, Bipolar,


                                                 Medication noncompliance,


                                                 Suicide attempt


     Current Diet                                NPO


     Labs/Tests                                  Reviewed


     Pertinent Medications                       Reviewed


     Height                                      5 ft 7 in


     Weight                                      81.2 kg


     Ideal Body Weight (kg)                      67.27


     BMI                                         28.0


     Subjective/Other Information                Consulted for evaluate


                                                 nutritional intake.


                                                 Pt on vent.


     Burn                                        Absent


     Trauma                                      Absent


     #1


      Nutrition Diagnosis                        Inadequate oral intake


      Etiology                                   on vent


      As Evidenced by Signs and Symptoms         NPO status


     Is patient on ventilator?                   Yes


     Is Patient Ambulatory and/or Out of Bed     No


     REE-([place]-St. Jeor-confined to bed)      [phone]


     Calculation Used for Recommendations        [facility]


     Additional Notes                            Protein Needs: 97-162g (1.2-2g


                                                 /kg)


                                                 Fluid Needs: 1 ml/kcal


 Nutrition Intervention


     Change Diet Order:                          TF consult


     Nutrition Support:                          Vital 1.2 at 75 ml/hr.


                                                 Water flush of 120 ml q 4 hrs.


     Kcal                                        2,160


     Protein (gm)                                135


     Fluid (mL)                                  1,460


     Goal #1                                     TF consult


     Anticipated Discharge Needs:                Unable to determine at this


                                                 time


     Follow-Up By:                               07/03/19


     Additional Comments                         Follow for TF consult

## 2019-07-01 NOTE — XRAY REPORT
ABDOMEN 1 VIEW(S)



INDICATION / CLINICAL INFORMATION:

Dobhoff placement.



COMPARISON: 

None available.



FINDINGS:



TUBES / LINES: The tip of the Dobbhoff tube projects over the distal stomach.

BOWEL GAS PATTERN: No significant abnormality. 

FREE AIR / EXTRALUMINAL GAS: None seen.



ADDITIONAL FINDINGS: No significant additional findings.



IMPRESSION:

1. The tip of the Dobbhoff tube projects over the distal stomach.



Signer Name: Milton Collazo MD 

Signed: 7/1/2019 8:35 AM

 Workstation Name: [email or web address]

## 2019-07-01 NOTE — XRAY REPORT
PROCEDURE: XR CHEST 1V AP 

 

TECHNIQUE:  Chest radiograph single view.  

 

HISTORY: follow up respiratory failure 

 

COMPARISONS: 6/30/2019 . 

 

FINDINGS: 

 

Satisfactory appearance of patient's endotracheal tube. No mediastinal shift. Cardiac silhouette is n
ot enlarged. No pneumothorax, effusion, or focal pulmonary opacity identified.  No acute skeletal fin
dings. 

 

IMPRESSION: 

 

Satisfactory appearance of the patient's support apparatus without pneumothorax. 

  

 

This document is electronically signed by Julio Richey MD., July 1 2019 04:57:56 AM ET

## 2019-07-02 LAB
ALBUMIN SERPL-MCNC: 3.2 G/DL (ref 3.9–5)
ALT SERPL-CCNC: 14 UNITS/L (ref 7–56)
BUN SERPL-MCNC: 7 MG/DL (ref 9–20)
BUN/CREAT SERPL: 7 %
CALCIUM SERPL-MCNC: 8 MG/DL (ref 8.4–10.2)
HEMOLYSIS INDEX: 10

## 2019-07-02 RX ADMIN — Medication SCH ML: at 22:11

## 2019-07-02 RX ADMIN — Medication SCH ML: at 10:15

## 2019-07-02 RX ADMIN — ENOXAPARIN SODIUM SCH MG: 100 INJECTION SUBCUTANEOUS at 22:11

## 2019-07-02 NOTE — CONSULTATION
CONSULTING PHYSICIAN:  Dr. Jamison.



REASON FOR CONSULTATION:  Acute hypoxemic respiratory failure, drug overdose.



CHIEF COMPLAINT AND HISTORY OF PRESENT ILLNESS:  As follows:  The patient is a

24-year-old black male with past medical history significant for bipolar

disorder with significant depressive component as well as medication

noncompliance, although the family seems to suggest that the patient was having

bad side effects with the medications that was affecting his job.  He was

brought into the Emergency Room yesterday, unable to give a history.  His

parents mentioned that they had been arguing continuously over the preceding

day.  He became angry, locked himself in the bedroom and she had him open

bottles of pills.  He then opened the door after taking an entire bottle of

Benadryl 50 mg tablets as well as an unknown quantity of ibuprofen 200 mg

tablets.  He told her he will not be there and will not be a problem for her. 

EMS was called.  He was unable to protect his airway.  He was intubated, placed

on the mechanical ventilator, started on IV fluids.  Poison Control was

contacted in the Emergency Room and appropriate interventions were done.  He was

brought into the intensive care unit and we are asked to see him this morning. 

When I stopped by to see him, he was more alert, he was responsive

appropriately, trying to get the feeding, breathing tube out.  I do not have any

history of vomiting or overt aspiration.  With regards to the patient's tobacco

use/abuse history, the family had denied that tobacco use history is really as

much of the history of presentation as I have.



PAST MEDICAL HISTORY:  Bipolar disorder with depressive component.



PAST SURGICAL HISTORY:  None.



MEDICATIONS:  He was on at the time I stopped by to see him were reviewed,

pertinent medications include the following:  Lovenox 40 mg subcu daily, Versed

drip at 2 mg per hour, propofol was going at 5 mcg per kilogram per minute.



ALLERGIES:  TYLENOL, nature of this allergy is unknown.



DIET:  Well-built gentleman, acute weight loss or gain history is unknown.



FAMILY AND SOCIAL HISTORY:  Apparently lives in the community.  Family had

denied alcohol, tobacco or illicit drug use or abuse at presentation.  He is

single.



FAMILY HISTORY:  Otherwise unknown.



REVIEW OF SYSTEMS:  Unobtainable secondary to the patient's medical and mental

condition.  Since he has been here, no gross hematochezia or melena, no gross

hematuria, no hematemesis, no bloody tracheal secretions, no witnessed seizures.

 Review of systems otherwise unobtainable.



PHYSICAL EXAMINATION:

VITAL SIGNS:  At presentation in the Emergency Room, vital signs show that he

was afebrile, temperature 98.9 degrees Fahrenheit with a pulse of 112,

respiratory rate of 30, blood pressure 140/97 and O2 sats were 97%, inspired

oxygen concentration at that time was not recorded.  He has remained essentially

afebrile since he has been here.  When I stopped by to see him, O2 sats were 98%

that was on the pressure support mode of ventilation, pressure support of 10 and

PEEP of 6 and 30% FiO2.

GENERAL:  Well-built young black male, normocephalic, atraumatic, on the

mechanical ventilator without significant patient's ventilator dys-synchrony.

EXAMINATION OF HEAD, EYES, EARS, NOSE AND THROAT:  He is anicteric.  No

conjunctival erythema.  Oropharynx is moist.  Endotracheal tube is taped at the

lips around 23-24 cm.  No gross jugular venous distention or thyromegaly. 

Grossly, no palpable lymph nodes in the supraclavicular or submandibular lymph

node chains.

LUNGS:  Auscultation of both lung fields unremarkable.  Lungs are clear

bilaterally.

HEART:  Heart sounds 1 and 2 are heard.  They were regular in rhythm without

overt rubs or murmurs.

ABDOMEN:  Soft, full, bowel sounds are positive, nontender.  No palpable

hepatosplenomegaly.

EXTREMITIES:  Without overt digital clubbing, no cyanosis, no pedal edema. 

Pedal pulses are strong 2+ bilaterally.

NEUROLOGIC:  Pupils are equal, round, about 4 mm, reactive to light. 

Extraocular muscle movements appeared intact.  He moved all 4 extremities

spontaneously.  Power was 5/5 bilaterally.  The skin was of normal turgor.  He

did have some tattoos over his skin.  No overt cellulitis or rash.  The mood, at

the time I saw him, was normal.  The affects are appropriate.



LABORATORY DATA:  From my review admission white count 7700, hemoglobin 15.7,

hematocrit 46.8, platelet count 202.  No band forms.  ABG showed a pH of 7.35,

pCO2 of 42, pO2 was not recorded at presentation.  Most recent gas shows a pH of

7.46, pCO2 of 31, pO2 of 216 that was on 40% on the assist control mode of

ventilation at that time.  PRVC, AC, tidal volume 450, rate of 24 and PEEP of 6.

 Serum sodium at presentation 139, potassium 4.1, chloride 102, bicarbonate 22,

BUN 12, creatinine 1.2, glucose was 84.  Lactic acid level was 2.3, is now

within normal limits.  Total bilirubin was up at 1.3, now within normal limits. 

Liver function tests otherwise essentially within normal limits.  Urinalysis

negative for nitrites and leukocyte esterase, does have 13 white cells per high

power field.  No significant glucose spillage.  Tylenol, aspirin and alcohol

levels undetectable.  Urine drug screen was presumptive positive for cocaine and

amphetamines.  A CT scan of the head was done at presentation, it was read as

normal on enhanced CT scan.  X-ray was without focal infiltrates.  Endotracheal

tube tip is at the level of the aortic knob.  No gross pneumothorax, no gross

bony fracture that I can see.



ASSESSMENT:

1.  Acute respiratory failure secondary to drug overdose.

2.  Drug overdose.

3.  History of bipolar disorder.

4.  Suicidal ideation.

5.  Mild hypokalemia.

6.  Lactic acidosis at presentation.



PLAN:  He is at 10:13 and that is appropriate.  He will need a psychiatric

evaluation before discharge.  We will continue the spontaneous breathing trial. 

If he passes the trial, he will be extubated.  A psychiatric evaluation will

follow soon after.  I will ask him for any psych meds, he might be taking now. 

We will go ahead and reintroduce this medication.  His family is in the room. 

His sister and brother have asked them to please remain around just to keep him

calm and not agitated.  We will continue to follow his electrolytes and follow

him hemodynamically.  No acute indication for antibiotics.  He is appropriately

being placed on GI and DVT prophylaxis.  Flu and pneumonia vaccination will be

addressed per protocol.



Thank you very much for the consult, Dr. Jamison.  We will follow along.  We will

make further recommendations as picture progresses/becomes clearer.  He is

critically ill on life-sustaining interventions including mechanical ventilatory

support at high risk for further deterioration including the risk of death.  At

this time, I spent about 35-40 minutes of critical care time without overlap and

excluding any procedural time that may be necessary.





DD: 07/01/2019 12:57

DT: 07/02/2019 01:59

JOB# [id number]  [id number]

ALDO/TED GAN

## 2019-07-02 NOTE — XRAY REPORT
CHEST 1 VIEW 



INDICATION / CLINICAL INFORMATION:

follow up respiratory failure.



COMPARISON: 

7/1/2019



FINDINGS:



SUPPORT DEVICES: ET tube has been removed. Enteric feeding tube has been removed.



HEART / MEDIASTINUM: No significant abnormality. 



LUNGS / PLEURA: No significant pulmonary or pleural abnormality. No pneumothorax. 



ADDITIONAL FINDINGS: No significant additional findings.



IMPRESSION:

1. No acute pulmonary disease.



Signer Name: Natacha Hitchcock MD 

Signed: 7/2/2019 4:51 AM

 Workstation Name: [id number]

## 2019-07-02 NOTE — DISCHARGE SUMMARY
Providers





- Providers


Date of Admission: 


06/30/19 19:17





Attending physician: 


VIANEY KILLIAN MD





                                        





06/30/19 18:35


Consult to Dietitian/Nutrition [CONS] Routine 


   Physician Instructions: 


   Reason For Exam: 


   Reason for Consult: Evaluate nutritional intake





06/30/19 18:36


Consult to Mental Health [CONS] Stat 


   Reason For Exam: overdose


   Place consult to::  


   Notified:: n





07/01/19 10:01


Consult to Physician [CONS] Routine 


   Comment: 


   Consulting Provider: STEPH OBANDO


   Physician Instructions: 


   Reason For Exam: critical care management





07/02/19 13:09


Consult to Mental Health [CONS] Routine 


   Reason For Exam: suicidal attempt


   Place consult to:: mental health


   Notified:: yes


   Phone number called:: [phone]


   Was contact made?: Yes


   If yes, spoke with:: Romulo


   Time called:: 11:30


   Comment:: patient seen by [facility]. Romulo saw  the patient already .











Primary care physician: 


[facility], MD








Hospitalization


Reason for admission: drug overdose, suicidal intent, toxic metabolic 

encephalopathy


Condition: Stable


Pertinent studies: 





CT head was negative


Hospital course: 





23 YO Male with Depression, Bipolar, Medication Noncompliance presents to ED for

 evaluation. Pt was unresponsive, intubated, and on ventilatory support at time 

of evaluation and is unable to history. Pt history provided by his fiance'. As 

per ashli' they have been arguing continuously over the past 1 day. The patient

 became angry and subsequently locked himself in the bedroom and was heard 

opening bottles of pills, and then opened the door after taking an entire bottle

 of diphenhydramine 50 mg tablets as well as unknown quantity of ibuprofen 200 

mg tablets.  Pt then told the fianc "I will not be here".  I will not be a 

problem for you."  EMS notified, and upon arrival the patient was found to be in

 distress and transported to [facility]. Pt seen and evaluated in ED and found to have 

Encephalopathy, and Acute Respiratory Failure, and is unable to protect his 

airway. Pt intubated and placed on vent support for Acute Respiratory Failure. 

Pt admitted to ICU. Poison control notified in ED. Pt inititated on IVF 

resuscitation therapy, with serial bmp to evaluated serum creatnine. No prior 

admission for review.  Patient was admitted to ICU and intubated, on mechanical 

ventilation, NG tube feeding.  Subsequently the patient was off the mechanical 

ventilation and saturating well on room air.


  Have a conversation with the patient this morning and he said he was taking 

alert sleeping pills and didn't get himself.  Patient has major depression was 

off medication for the last 2 months because of financial issue.  Patient has 

been using amphetamine and cocaine.  Patient was alert and oriented, toxic 

encephalopathy resolved.  I have a discussion with psych and they told him he is

 going inpatient psych facility.  Patient is medically stable for discharge.





Disposition: [id number]/[id number] HOSP/PSY UNIT


Time spent for discharge: 32 minutes





- Discharge Diagnoses


(1) Acute respiratory failure


Status: Acute   





(2) History of bipolar disorder


Status: Acute   





(3) Intentional drug overdose


Status: Acute   





(4) Suicide attempt


Status: Acute   





(5) Toxic metabolic encephalopathy


Status: Acute   





(6) Major depression


Status: Acute   





(7) Non compliance w medication regimen


Status: Acute   





Core Measure Documentation





- Palliative Care


Palliative Care/ Comfort Measures: Not Applicable





- Core Measures


Any of the following diagnoses?: none





Exam





- Physical Exam


Narrative exam: 





Intubated on mechanical ventilation


 The patient appeared well nourished and normally developed.


 Vital signs as documented.


 Head exam is unremarkable.


 No scleral icterus .


 Neck is without jugular venous distension, thyromegaly, or carotid bruits. 


 Lungs are clear to auscultation.


Cardiac exam reveals regular rate and  Rhythm. First and second heart sounds 

normal. No murmurs, rubs or gallops. 


Abdominal exam reveals normal bowel sounds, no masses, no organomegaly and no 

aortic enlargement. 


Extremities are nonedematous and both femoral and pedal pulses are normal.


CNS: patient is comatose.





- Constitutional


Vitals: 


                                        











Temp Pulse Resp BP Pulse Ox


 


 98.0 F   56 L  15   116/70   96 


 


 07/02/19 13:00  07/02/19 12:30  07/02/19 12:30  07/02/19 12:30  07/02/19 12:30














Plan


Activity: no restrictions


Weight Bearing Status: Full Weight Bearing


Diet: regular


Follow up with: 


[facility], MD [Primary Care Provider] - 7 Days

## 2019-07-02 NOTE — PROGRESS NOTE
Subjective





- Reason for Consult


Consult date: 07/02/19


Reason for consult: Psychiatry Follow-up





- Chief Complaint


Chief complaint: 


"I wanted to kill myself"





24 y.o. AA male who presented to the ER for overdosing on pills. Today the 

patient was calm and cooperative during the assessment. He stated that he was 

struggling with life stressors (relationship issues and lack of finances). Also,

he stated that he is on federal probation at this time. He stated stated that he

got into an argument with is girlfriend 2 days ago, locked himself in the 

bathroom at their home, and took several sleeping pill.s. He stated that he 

attempted to kill himself because he was hurting "mentally." He stated that he 

suppose to take Prozac for depression, but have not been compliant with the 

medication. He acknowledged a previous suicide attempt in the past when asked. 

He rate his depression 6/10, with 10 being the worse. He denies SI/HI's and 

AVH's. He denies a poor appetite and erratic sleep. He stated that he use 

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 

shift. He denies alcohol consumption (etoh). 





Mental Status Exam





- Vital signs


                                Last Vital Signs











Temp  98.0 F   07/02/19 13:00


 


Pulse  56 L  07/02/19 12:30


 


Resp  15   07/02/19 12:30


 


BP  116/70   07/02/19 12:30


 


Pulse Ox  96   07/02/19 12:30














- Exam


Narrative exam: 


MSE:





 Appearance: calm, cooperative  


 Behavior: regular eye contact    


 Speech: regular rate and tone   


 Mood: "okay" withdrawn 


 Affect: congruent to mood 


 Thought Process: circumstantial 


 Thought Content: denies SI/HI's and AVH's 


 Motor Activity: ambulatory 


 Cognition: A/O x3


 Insight: variable to fair 


 Judgment: poor 








  





  I














Assessment and Plan


Impression. MDD, Severe Type. Substance Use DO (cocaine/amphetamines). Today the

patent was calm and cooperative during the assessment. 





DDx: Substance Induced Mood DO





Recommendation/Plan: Continue 1013 and start Prozac 20 mg PO daily for 

depression. Discussed possible suicidality/medication induced shira shira with 

the patient reference Prozac, he verbalized understanding. 





Dispo: The patient was referred to inpatient psy services.





Staffed with Dr ADINA Monaco.

## 2019-07-02 NOTE — PROGRESS NOTE
Assessment and Plan


Assessment and plan: 





25 YO Male with Depression, Bipolar, Medication Noncompliance presents to ED for

evaluation. Pt unresponsive, intubated, and on ventilatory support at time of 

evaluation and is unable to history. Pt history provided by his fiance'. As per 

ashli' they have been arguing continuously over the past 1 day. The patient 

became angry and subsequently locked himself in the bedroom and was heard 

opening bottles of pills, and then opened the door after taking an entire bottle

of diphenhydramine 50 mg tablets as well as unknown quantity of ibuprofen 200 mg

tablets.  Pt then told the fianc "I will not be here".  I will not be a problem

for you."  EMS notified, and upon arrival the patient was found to be in 

distress and transported to [facility]. Pt seen and evaluated in ED and found to have 

Encephalopathy, and Acute Respiratory Failure, and is unable to protect his 

airway. Pt intubated and placed on vent support for Acute Respiratory Failure. 

Pt admitted to ICU. Poison control notified in ED. Pt inititated on IVF 

resuscitation therapy, with serial bmp to evaluated serum creatnine. No prior 

admission for review. All listed medication reconciled at time of admission. 





Respiratory failure


-Resolved


-Patient is saturating well on room air


Toxic encephalopathy


- Patient is alert and oriented


Suicidal attempt, history of major depression with medication noncompliance


- Mental health saw him yesterday and recommended to continue 1013, reconsult 

today to evaluate the patient


DVT


- Lovenox


Disposition


- Transfer to the floor


- Patient is medically cleared, and will be discharged after psych clearance











History


Interval history: 





Patient was seen and evaluated this morning, patient was alert and oriented.





Hospitalist Physical





- Physical exam


Narrative exam: 





Intubated on mechanical ventilation


 The patient appeared well nourished and normally developed.


 Vital signs as documented.


 Head exam is unremarkable.


 No scleral icterus .


 Neck is without jugular venous distension, thyromegaly, or carotid bruits. 


 Lungs are clear to auscultation.


Cardiac exam reveals regular rate and  Rhythm. First and second heart sounds 

normal. No murmurs, rubs or gallops. 


Abdominal exam reveals normal bowel sounds, no masses, no organomegaly and no 

aortic enlargement. 


Extremities are nonedematous and both femoral and pedal pulses are normal.


CNS: patient is comatose.





- Constitutional


Vitals: 


                                        











Temp Pulse Resp BP Pulse Ox


 


 98.0 F   56 L  15   116/70   96 


 


 07/02/19 13:00  07/02/19 12:30  07/02/19 12:30  07/02/19 12:30  07/02/19 12:30











General appearance: Present: severe distress





Results





- Labs


CBC & Chem 7: 


                                 07/01/19 05:10





                                 07/02/19 08:59


Labs: 


                             Laboratory Last Values











WBC  7.0 K/mm3 (4.5-11.0)   07/01/19  05:10    


 


RBC  4.10 M/mm3 (3.65-5.03)   07/01/19  05:10    


 


Hgb  13.3 gm/dl (11.8-15.2)   07/01/19  05:10    


 


Hct  38.2 % (35.5-45.6)  D 07/01/19  05:10    


 


MCV  93 fl (84-94)   07/01/19  05:10    


 


MCH  33 pg (28-32)  H  07/01/19  05:10    


 


MCHC  35 % (32-34)  H  07/01/19  05:10    


 


RDW  13.7 % (13.2-15.2)   07/01/19  05:10    


 


Plt Count  161 K/mm3 (140-440)   07/01/19  05:10    


 


Lymph % (Auto)  31.5 % (13.4-35.0)   07/01/19  05:10    


 


Mono % (Auto)  8.6 % (0.0-7.3)  H  07/01/19  05:10    


 


Eos % (Auto)  0.5 % (0.0-4.3)   07/01/19  05:10    


 


Baso % (Auto)  0.2 % (0.0-1.8)   07/01/19  05:10    


 


Lymph #  2.2 K/mm3 (1.2-5.4)   07/01/19  05:10    


 


Mono #  0.6 K/mm3 (0.0-0.8)   07/01/19  05:10    


 


Eos #  0.0 K/mm3 (0.0-0.4)   07/01/19  05:10    


 


Baso #  0.0 K/mm3 (0.0-0.1)   07/01/19  05:10    


 


Seg Neutrophils %  59.2 % (40.0-70.0)   07/01/19  05:10    


 


Seg Neutrophils #  4.1 K/mm3 (1.8-7.7)   07/01/19  05:10    


 


POC ABG pH  7.339  (7.35-7.45)  L  07/01/19  12:52    


 


POC ABG pCO2  41.8  (35-45)   07/01/19  12:52    


 


POC ABG pO2  119  ()  H  07/01/19  12:52    


 


POC ABG HCO3  22.5  (22-26 mml/L)   07/01/19  12:52    


 


POC ABG Total CO2  24  (23-27mmol/L)   07/01/19  12:52    


 


POC ABG O2 Sat  98   07/01/19  12:52    


 


POC ABG Base Excess  -3  ((-2) - (+3)mmol/L)   07/01/19  12:52    


 


  28 %  07/01/19  12:52    


 


Sodium  142 mmol/L (137-145)   07/02/19  08:59    


 


Potassium  3.9 mmol/L (3.6-5.0)   07/02/19  08:59    


 


Chloride  107.2 mmol/L ()  H  07/02/19  08:59    


 


Carbon Dioxide  25 mmol/L (22-30)   07/02/19  08:59    


 


  14 mmol/L  07/02/19  08:59    


 


BUN  7 mg/dL (9-20)  L  07/02/19  08:59    


 


  1.0 mg/dL (0.8-1.5)   07/02/19  08:59    


 


Estimated GFR  > 60 ml/min  07/02/19  08:59    


 


  7 %  07/02/19  08:59    


 


Glucose  131 mg/dL ()  H  07/02/19  08:59    


 


Lactic Acid  0.90 mmol/L (0.7-2.0)   07/01/19  05:10    


 


Calcium  8.0 mg/dL (8.4-10.2)  L  07/02/19  08:59    


 


Magnesium  1.70 mg/dL (1.7-2.3)   07/02/19  08:59    


 


  0.80 mg/dL (0.1-1.2)   07/02/19  08:59    


 


AST  23 units/L (5-40)   07/02/19  08:59    


 


ALT  14 units/L (7-56)   07/02/19  08:59    


 


  55 units/L ()   07/02/19  08:59    


 


  6.1 g/dL (6.3-8.2)  L  07/02/19  08:59    


 


  3.2 g/dL (3.9-5)  L  07/02/19  08:59    


 


  1.1 %  07/02/19  08:59    


 


TSH  0.298 mlU/mL (0.270-4.200)   06/30/19  18:40    


 


  Yellow  (Yellow)   06/30/19  19:30    


 


  Slightly-cloudy  (Clear)   06/30/19  19:30    


 


  6.0  (5.0-7.0)   06/30/19  19:30    


 


Ur Specific Gravity  1.027  (1.003-1.030)   06/30/19  19:30    


 


  >500 mg/dL (Negative)   06/30/19  19:30    


 


  50 mg/dL (Negative)   06/30/19  19:30    


 


  20 mg/dL (Negative)   06/30/19  19:30    


 


  Neg  (Negative)   06/30/19  19:30    


 


  Neg  (Negative)   06/30/19  19:30    


 


  Neg  (Negative)   06/30/19  19:30    


 


  < 2.0 mg/dL (<2.0)   06/30/19  19:30    


 


Ur Leukocyte Esterase  Neg  (Negative)   06/30/19  19:30    


 


  13.0 /HPF (0.0-6.0)  H  06/30/19  19:30    


 


  11.0 /HPF (0.0-6.0)   06/30/19  19:30    


 


U Epithel Cells (Auto)  2.0 /HPF (0-13.0)   06/30/19  19:30    


 


  1+ /HPF (Negative)   06/30/19  19:30    


 


  1+ /HPF  06/30/19  19:30    


 


Salicylates  < 0.3 mg/dL (2.8-20.0)  L  06/30/19  18:40    


 


  Presumptive negative   06/30/19  19:30    


 


  Presumptive negative   06/30/19  19:30    


 


Acetaminophen  < 5.0 ug/mL (10.0-30.0)  L  06/30/19  18:40    


 


Ur Barbiturates Screen  Presumptive negative   06/30/19  19:30    


 


Ur Phencyclidine Scrn  Presumptive negative   06/30/19  19:30    


 


Ur Amphetamines Screen  Presumptive positive   06/30/19  19:30    


 


U Benzodiazepines Scrn  Presumptive negative   06/30/19  19:30    


 


  Presumptive positive   06/30/19  19:30    


 


U Marijuana (THC) Screen  Presumptive negative   06/30/19  19:30    


 


  Disclamer   06/30/19  19:30    


 


Plasma/Serum Alcohol  < 0.01 % (0-0.07)   06/30/19  18:40    














Active Medications





- Current Medications


Current Medications: 














Generic Name Dose Route Start Last Admin





  Trade Name Freq  PRN Reason Stop Dose Admin


 


Benzocaine/Menthol  1 each  07/01/19 14:32  07/01/19 22:09





  Cepacol X Strength  MM   1 each





  Q2H PRN   Administration





  Sore Throat  


 


Enoxaparin Sodium  40 mg  07/01/19 22:00  07/01/19 22:05





  Lovenox  SUB-Q   40 mg





  QDAY@2200 JOSE   Administration


 


Hydrophilic Ointment  1 applic  06/30/19 18:34 





  Vaseline Lip Therapy  TP  





  Q2HR PRN  





  Dry Lips  


 


Sodium Chloride  1,000 mls @ 125 mls/hr  06/30/19 20:00  07/01/19 20:02





  Nacl 0.9% 1000 Ml  IV   125 mls/hr





  AS DIRECT JOSE   Administration


 


Midazolam HCl 100 mg/ Sodium  100 mls @ 2 mls/hr  06/30/19 20:00  07/01/19 09:01





  Chloride  IV   Infused





  TITR JOSE   Titration





  Protocol  





  2 MG/HR  


 


Propofol  1,000 mg in 100 mls @ 2.436 mls/hr  06/30/19 19:00  07/01/19 14:01





  Diprivan 10 Mg/Ml  IV   0 mcg/kg/min





  TITR JOSE   0 mls/hr





    Titration





  Protocol  





  5 MCG/KG/MIN  


 


Midazolam HCl  2 mg  06/30/19 19:29 





  Versed  IV  





  Q10MIN PRN  





  Sedation  


 


Multi-Ingred Cream/Lotion/Oil/Oint  1 applic  06/30/19 18:34 





  Artificial Tears Ophth Oint  OU  





  Q4HR PRN  





  Dry Eye(s)  


 


Sodium Chloride  10 ml  06/30/19 22:00  07/02/19 10:15





  Sodium Chloride Flush Syringe 10 Ml  IV   10 ml





  BID JOSE   Administration


 


Sodium Chloride  10 ml  06/30/19 19:17 





  Sodium Chloride Flush Syringe 10 Ml  IV  





  PRN PRN  





  LINE FLUSH  














Nutrition/Malnutrition Assess





- Dietary Evaluation


Nutrition/Malnutrition Findings: 


                                        





Nutrition Notes                                            Start:  07/01/19 

14:28


Freq:                                                      Status: Active       




Protocol:                                                                       




 Document     07/01/19 14:28  RM  (Rec: 07/01/19 14:36  RM  [id number])


 Nutrition Notes


     Need for Assessment generated from:         MD Order


     Initial or Follow up                        Assessment


     Other Pertinent Diagnosis                   Depression, Bipolar,


                                                 Medication noncompliance,


                                                 Suicide attempt


     Current Diet                                NPO


     Labs/Tests                                  Reviewed


     Pertinent Medications                       Reviewed


     Height                                      5 ft 7 in


     Weight                                      81.2 kg


     Ideal Body Weight (kg)                      67.27


     BMI                                         28.0


     Subjective/Other Information                Consulted for evaluate


                                                 nutritional intake.


                                                 Pt on vent.


     Burn                                        Absent


     Trauma                                      Absent


     #1


      Nutrition Diagnosis                        Inadequate oral intake


      Etiology                                   on vent


      As Evidenced by Signs and Symptoms         NPO status


     Is patient on ventilator?                   Yes


     Is Patient Ambulatory and/or Out of Bed     No


     REE-([facility]-confined to bed)      [phone]


     Calculation Used for Recommendations        [facility]


     Additional Notes                            Protein Needs: 97-162g (1.2-2g


                                                 /kg)


                                                 Fluid Needs: 1 ml/kcal


 Nutrition Intervention


     Change Diet Order:                          TF consult


     Nutrition Support:                          Vital 1.2 at 75 ml/hr.


                                                 Water flush of 120 ml q 4 hrs.


     Kcal                                        2,160


     Protein (gm)                                135


     Fluid (mL)                                  1,460


     Goal #1                                     TF consult


     Anticipated Discharge Needs:                Unable to determine at this


                                                 time


     Follow-Up By:                               07/03/19


     Additional Comments                         Follow for TF consult

## 2019-07-02 NOTE — PROGRESS NOTE
Assessment and Plan








Acute respiratory failure on MVS secondary to drug overdose.


Drug overdose.


History of bipolar disorder.


Suicide Attempt


Mild hypokalemia.


Lactic acidosis at presentation





- prn oxygen suppplementation


- prn analgesia


- prn antipsychotics


- 1 on 1 watch / sitter





...transfer to medical floor under 1013 status





.... discussed with Psych team





Subjective


Date of service: 07/02/19


Principal diagnosis: Ac Resp failure s/p MVS; Drug OD; bipolar disorder; Suicide

Attempt


Interval history: 





Patient is seen today for: Acute respiratory failure on MVS secondary to drug 

overdose; Drug overdose; History of bipolar disorder; Suicide Attempt; Mild 

hypokalemia; Lactic acidosis at presentation





Seen and examined at bedside; 24hour events reviewed; nursing and respiratory 

care staff consulted; no adverse overnight events reported to me; doing better; 

No N/V/F/C; denies acute chest pains or palpitations; seen by Psych and remains 

a 1013 status











Objective


                               Vital Signs - 12hr











  07/01/19 07/01/19 07/01/19





  23:00 23:11 23:21


 


Temperature   


 


Pulse Rate 72 78 77


 


Respiratory 18 21 19





Rate   


 


Blood Pressure 125/68 125/68 107/51


 


O2 Sat by Pulse 100 100 99





Oximetry   














  07/01/19 07/01/19 07/01/19





  23:30 23:41 23:43


 


Temperature   


 


Pulse Rate 73 67 68


 


Respiratory 14 16 19





Rate   


 


Blood Pressure 118/87 118/87 118/87


 


O2 Sat by Pulse 96 99 99





Oximetry   














  07/01/19 07/01/19 07/02/19





  23:48 23:51 00:00


 


Temperature 99.2 F  


 


Pulse Rate  73 69


 


Respiratory  27 H 23





Rate   


 


Blood Pressure  117/60 106/41


 


O2 Sat by Pulse  99 96





Oximetry   














  07/02/19 07/02/19 07/02/19





  00:11 00:21 00:30


 


Temperature   


 


Pulse Rate 66 65 68


 


Respiratory 22 18 12





Rate   


 


Blood Pressure 106/41 103/49 121/84


 


O2 Sat by Pulse 99 98 98





Oximetry   














  07/02/19 07/02/19 07/02/19





  00:41 00:51 01:00


 


Temperature   


 


Pulse Rate 74 67 67


 


Respiratory 15 19 22





Rate   


 


Blood Pressure 121/84 113/66 113/66


 


O2 Sat by Pulse 100 98 99





Oximetry   














  07/02/19 07/02/19 07/02/19





  01:11 01:21 01:30


 


Temperature   


 


Pulse Rate 63 71 62


 


Respiratory 21 19 16





Rate   


 


Blood Pressure 119/79 119/79 116/81


 


O2 Sat by Pulse 100 99 95





Oximetry   














  07/02/19 07/02/19 07/02/19





  01:41 01:51 02:00


 


Temperature   


 


Pulse Rate 71 89 68


 


Respiratory 20 20 20





Rate   


 


Blood Pressure 116/81 116/81 115/73


 


O2 Sat by Pulse 99 97 99





Oximetry   














  07/02/19 07/02/19 07/02/19





  02:10 02:21 02:30


 


Temperature   


 


Pulse Rate 70 62 64


 


Respiratory 20 17 9 L





Rate   


 


Blood Pressure 108/84 108/84 112/58


 


O2 Sat by Pulse 98 100 





Oximetry   














  07/02/19 07/02/19 07/02/19





  02:41 02:51 03:00


 


Temperature   


 


Pulse Rate 60 78 82


 


Respiratory 12 15 19





Rate   


 


Blood Pressure 112/58 112/58 114/78


 


O2 Sat by Pulse 99 98 96





Oximetry   














  07/02/19 07/02/19 07/02/19





  03:11 03:21 03:30


 


Temperature   


 


Pulse Rate 61 57 L 60


 


Respiratory 20 15 20





Rate   


 


Blood Pressure 114/78 114/78 118/71


 


O2 Sat by Pulse 98 99 95





Oximetry   














  07/02/19 07/02/19 07/02/19





  03:34 03:41 03:51


 


Temperature 98.5 F  


 


Pulse Rate  59 L 57 L


 


Respiratory  21 19





Rate   


 


Blood Pressure  118/71 118/71


 


O2 Sat by Pulse  99 99





Oximetry   














  07/02/19 07/02/19 07/02/19





  04:00 04:11 04:21


 


Temperature   


 


Pulse Rate 63 56 L 71


 


Respiratory 19 18 16





Rate   


 


Blood Pressure 115/68 115/68 115/68


 


O2 Sat by Pulse 95 99 98





Oximetry   














  07/02/19 07/02/19 07/02/19





  04:30 04:41 04:51


 


Temperature   


 


Pulse Rate 60 57 L 57 L


 


Respiratory 15 19 18





Rate   


 


Blood Pressure 119/73 119/73 119/73


 


O2 Sat by Pulse 97 98 99





Oximetry   














  07/02/19 07/02/19 07/02/19





  05:00 05:11 05:21


 


Temperature   


 


Pulse Rate 59 L 63 61


 


Respiratory 18 16 16





Rate   


 


Blood Pressure 124/79 124/79 121/79


 


O2 Sat by Pulse 96 98 98





Oximetry   














  07/02/19 07/02/19 07/02/19





  05:30 05:41 05:51


 


Temperature   


 


Pulse Rate 67 65 65


 


Respiratory 16 16 16





Rate   


 


Blood Pressure 119/79 119/79 124/76


 


O2 Sat by Pulse 96 98 97





Oximetry   














  07/02/19 07/02/19 07/02/19





  06:00 06:11 06:20


 


Temperature   


 


Pulse Rate 67 64 65


 


Respiratory 16 15 16





Rate   


 


Blood Pressure 122/78 122/78 122/78


 


O2 Sat by Pulse 95 95 97





Oximetry   














  07/02/19 07/02/19 07/02/19





  06:30 06:41 06:51


 


Temperature   


 


Pulse Rate 64 66 64


 


Respiratory 15 10 L 15





Rate   


 


Blood Pressure 108/62 108/62 108/62


 


O2 Sat by Pulse 97 98 98





Oximetry   














  07/02/19 07/02/19 07/02/19





  07:00 07:11 07:21


 


Temperature   


 


Pulse Rate 66 62 71


 


Respiratory 15 14 11 L





Rate   


 


Blood Pressure 115/76 115/76 117/72


 


O2 Sat by Pulse 95 99 98





Oximetry   














  07/02/19 07/02/19 07/02/19





  07:30 07:41 07:51


 


Temperature   


 


Pulse Rate 68 55 L 53 L


 


Respiratory 17 12 12





Rate   


 


Blood Pressure 117/78 117/78 116/71


 


O2 Sat by Pulse 97 99 99





Oximetry   














  07/02/19 07/02/19 07/02/19





  08:00 08:11 08:16


 


Temperature   98.6 F


 


Pulse Rate 83 58 L 


 


Respiratory 14 13 





Rate   


 


Blood Pressure 113/62 113/62 


 


O2 Sat by Pulse 97 98 





Oximetry   














  07/02/19 07/02/19 07/02/19





  08:21 08:31 08:41


 


Temperature   


 


Pulse Rate 57 L 84 67


 


Respiratory 14 15 13





Rate   


 


Blood Pressure 115/68 113/62 113/62


 


O2 Sat by Pulse 99 98 99





Oximetry   














  07/02/19 07/02/19 07/02/19





  08:50 08:51 08:59


 


Temperature   98.6 F


 


Pulse Rate  58 L 


 


Respiratory  18 





Rate   


 


Blood Pressure  122/80 


 


O2 Sat by Pulse 96 97 





Oximetry   














  07/02/19 07/02/19 07/02/19





  09:00 09:11 09:21


 


Temperature   


 


Pulse Rate 62 57 L 58 L


 


Respiratory 8 L 18 19





Rate   


 


Blood Pressure 118/72 118/72 127/68


 


O2 Sat by Pulse 100 99 99





Oximetry   














  07/02/19 07/02/19 07/02/19





  09:30 09:41 09:51


 


Temperature   


 


Pulse Rate 57 L 63 58 L


 


Respiratory 19 19 18





Rate   


 


Blood Pressure 122/75 122/75 126/82


 


O2 Sat by Pulse 98 100 99





Oximetry   














  07/02/19 07/02/19





  10:00 10:11


 


Temperature  


 


Pulse Rate 55 L 66


 


Respiratory 15 17





Rate  


 


Blood Pressure 119/80 119/80


 


O2 Sat by Pulse 99 99





Oximetry  











Constitutional: no acute distress, alert


Eyes: non-icteric


ENT: oropharynx moist


Neck: supple, no lymphadenopathy, no JVD


Effort: normal


Ascultation: Bilateral: clear


Percussion: Bilateral: not dull


Cardiovascular: regular rate and rhythm


Gastrointestinal: normoactive bowel sounds, soft, non-tender, non-distended


Integumentary: normal


Extremities: no cyanosis, no edema, pulses normal, no ischemia or petechiae


Neurologic: normal mental status, non-focal exam, pupils equal and round, CN II-

XII normal, motor strength normal and


Psychiatric: mood appropriate, affect normal


CBC and BMP: 


                                 07/01/19 05:10





                                 07/02/19 08:59


ABG, PT/INR, D-dimer: 


ABG











POC ABG pH  7.339  (7.35-7.45)  L  07/01/19  12:52    


 


POC ABG pCO2  41.8  (35-45)   07/01/19  12:52    


 


POC ABG pO2  119  ()  H  07/01/19  12:52    


 


POC ABG HCO3  22.5  (22-26 mml/L)   07/01/19  12:52    


 


POC ABG Total CO2  24  (23-27mmol/L)   07/01/19  12:52    


 


POC ABG O2 Sat  98   07/01/19  12:52    








Abnormal lab findings: 


                                  Abnormal Labs











  06/30/19 06/30/19 06/30/19





  18:40 18:40 18:40


 


Hgb  15.7 H  


 


Hct  46.8 H  


 


MCV  96 H  


 


MCH   


 


MCHC   


 


Lymph % (Auto)  35.2 H  


 


Mono % (Auto)  11.7 H  


 


Mono #  0.9 H  


 


POC ABG pH   


 


POC ABG pCO2   


 


POC ABG pO2   


 


Potassium   


 


Chloride   


 


Carbon Dioxide   


 


BUN   


 


Glucose   


 


Lactic Acid   


 


Calcium   


 


Total Bilirubin   1.30 H 


 


AST   42 H 


 


Total Protein   


 


Albumin   


 


Urine WBC (Auto)   


 


Salicylates    < 0.3 L


 


Acetaminophen   














  06/30/19 06/30/19 06/30/19





  18:40 18:40 19:30


 


Hgb   


 


Hct   


 


MCV   


 


MCH   


 


MCHC   


 


Lymph % (Auto)   


 


Mono % (Auto)   


 


Mono #   


 


POC ABG pH   


 


POC ABG pCO2   


 


POC ABG pO2   


 


Potassium   


 


Chloride   


 


Carbon Dioxide   


 


BUN   


 


Glucose   


 


Lactic Acid   2.30 H* 


 


Calcium   


 


Total Bilirubin   


 


AST   


 


Total Protein   


 


Albumin   


 


Urine WBC (Auto)    13.0 H


 


Salicylates   


 


Acetaminophen  < 5.0 L  














  06/30/19 07/01/19 07/01/19





  20:20 05:01 05:10


 


Hgb   


 


Hct   


 


MCV   


 


MCH    33 H


 


MCHC    35 H


 


Lymph % (Auto)   


 


Mono % (Auto)    8.6 H


 


Mono #   


 


POC ABG pH   7.458 H 


 


POC ABG pCO2   31.3 L 


 


POC ABG pO2   216 H 


 


Potassium   


 


Chloride   


 


Carbon Dioxide   


 


BUN   


 


Glucose   


 


Lactic Acid  2.50 H*  


 


Calcium   


 


Total Bilirubin   


 


AST   


 


Total Protein   


 


Albumin   


 


Urine WBC (Auto)   


 


Salicylates   


 


Acetaminophen   














  07/01/19 07/01/19 07/02/19





  05:10 12:52 08:59


 


Hgb   


 


Hct   


 


MCV   


 


MCH   


 


MCHC   


 


Lymph % (Auto)   


 


Mono % (Auto)   


 


Mono #   


 


POC ABG pH   7.339 L 


 


POC ABG pCO2   


 


POC ABG pO2   119 H 


 


Potassium  3.5 L  


 


Chloride  109.1 H   107.2 H


 


Carbon Dioxide  20 L  


 


BUN    7 L


 


Glucose  64 L   131 H


 


Lactic Acid   


 


Calcium  8.2 L   8.0 L


 


Total Bilirubin   


 


AST   


 


Total Protein  5.7 L D   6.1 L


 


Albumin  3.5 L   3.2 L


 


Urine WBC (Auto)   


 


Salicylates   


 


Acetaminophen   











Allied health notes reviewed: nursing

## 2019-07-03 VITALS — DIASTOLIC BLOOD PRESSURE: 89 MMHG | SYSTOLIC BLOOD PRESSURE: 131 MMHG

## 2019-07-03 RX ADMIN — Medication SCH ML: at 10:26

## 2019-07-03 NOTE — XRAY REPORT
CHEST 1 VIEW 



INDICATION: 

follow up respiratory failure.



COMPARISON: 

7/2/2019



FINDINGS:

Support devices: None.



Heart: Within normal limits. 

Lungs/Pleura: No acute air space or interstitial disease. 



Additional findings: None.



IMPRESSION:

Normal portable chest x-ray.



Signer Name: Darwin Vela Jr, MD 

Signed: 7/3/2019 8:39 AM

 Workstation Name: [id number]

## 2019-07-03 NOTE — PROGRESS NOTE
Subjective





- Reason for Consult


Consult date: 07/03/19


Reason for consult: Psychiatric Follow-up Evaluation





- Chief Complaint


Chief complaint: 


"I wanted to kill myself"





24 y.o. AA male who presented to the ER for overdosing on pills. Today the 

patient was calm and cooperative during the assessment. He stated that he was 

struggling with life stressors (relationship issues and lack of finances). Also,

he stated that he is on federal probation at this time. He stated stated that he

got into an argument with is girlfriend 2 days ago, locked himself in the 

bathroom at their home, and took several sleeping pills. 





Mental Status Exam





- Vital signs


                                Last Vital Signs











Temp  98.2 F   07/03/19 12:03


 


Pulse  55 L  07/03/19 12:03


 


Resp  18   07/03/19 12:03


 


BP  118/70   07/03/19 12:03


 


Pulse Ox  96   07/03/19 12:03














- Exam


Narrative exam: 


Mental Status Exam:





 Appearance: calm, cooperative  


 Behavior: regular eye contact    


 Speech: regular rate and tone   


 Mood: "okay" withdrawn 


 Affect: congruent to mood 


 Thought Process: circumstantial 


 Thought Content: denies SI/HI's and AVH's 


 Motor Activity: ambulatory 


 Cognition: A/O x3


 Insight: variable to fair 


 Judgment: poor 











Assessment and Plan


Impression: MDD, Severe Type. Substance Use DO (cocaine/amphetamines). Today the

patent was calm and cooperative during the assessment. 





DDx: Substance Induced Mood DO





Recommendation/Plan: Continue 1013 and start Prozac 20 mg PO daily for dep

ression. Discussed possible suicidality/medication induced shira shira with the 

patient reference Prozac, he verbalized understanding. 





Disposition: The patient was referred to inpatient psy services.





Staffed with Dr ADINA Monaco.

## 2019-07-03 NOTE — PROGRESS NOTE
Assessment and Plan


Assessment and plan: 





25 YO Male with Depression, Bipolar, Medication Noncompliance presents to ED for

evaluation. Pt unresponsive, intubated, and on ventilatory support at time of 

evaluation and is unable to history. Pt history provided by his fiyao'. As per 

ashli' they have been arguing continuously over the past 1 day. The patient 

became angry and subsequently locked himself in the bedroom and was heard 

opening bottles of pills, and then opened the door after taking an entire bottle

of diphenhydramine 50 mg tablets as well as unknown quantity of ibuprofen 200 mg

tablets.  Pt then told the fianc "I will not be here".  I will not be a problem

for you."  EMS notified, and upon arrival the patient was found to be in 

distress and transported to [facility]. Pt seen and evaluated in ED and found to have 

Encephalopathy, and Acute Respiratory Failure, and is unable to protect his 

airway. Pt intubated and placed on vent support for Acute Respiratory Failure. 

Pt admitted to ICU. Poison control notified in ED. Pt inititated on IVF 

resuscitation therapy, with serial bmp to evaluated serum creatnine. No prior 

admission for review. All listed medication reconciled at time of admission. 





Respiratory failure


-Resolved


-Patient is saturating well on room air


Toxic encephalopathy


- Patient is alert and oriented


Suicidal attempt, history of major depression with medication noncompliance


- Mental health saw him yesterday and recommended to continue 1013, reconsult 

today to evaluate the patient


DVT


- Lovenox


Disposition


- Transfer to the floor


- Patient is medically cleared, and will be discharged after psych clearance











- Patient Problems


(1) Acute respiratory failure


Current Visit: Yes   Status: Acute   





(2) History of bipolar disorder


Current Visit: Yes   Status: Acute   





(3) Intentional drug overdose


Current Visit: Yes   Status: Acute   





(4) Suicide attempt


Current Visit: Yes   Status: Acute   





(5) Toxic metabolic encephalopathy


Current Visit: Yes   Status: Acute   





(6) Major depression


Current Visit: Yes   Status: Acute   





(7) Non compliance w medication regimen


Current Visit: Yes   Status: Acute   





History


Interval history: 





Patient was seen and evaluated this morning, patient was alert and oriented.





Hospitalist Physical





- Physical exam


Narrative exam: 





Intubated on mechanical ventilation


 The patient appeared well nourished and normally developed.


 Vital signs as documented.


 Head exam is unremarkable.


 No scleral icterus .


 Neck is without jugular venous distension, thyromegaly, or carotid bruits. 


 Lungs are clear to auscultation.


Cardiac exam reveals regular rate and  Rhythm. First and second heart sounds 

normal. No murmurs, rubs or gallops. 


Abdominal exam reveals normal bowel sounds, no masses, no organomegaly and no 

aortic enlargement. 


Extremities are nonedematous and both femoral and pedal pulses are normal.


CNS: patient is comatose.





- Constitutional


Vitals: 


                                        











Temp Pulse Resp BP Pulse Ox


 


 98.2 F   55 L  18   118/70   96 


 


 07/03/19 12:03  07/03/19 12:03  07/03/19 12:03  07/03/19 12:03  07/03/19 12:03











General appearance: Present: severe distress





Results





- Labs


CBC & Chem 7: 


                                 07/01/19 05:10





                                 07/02/19 08:59


Labs: 


                             Laboratory Last Values











WBC  7.0 K/mm3 (4.5-11.0)   07/01/19  05:10    


 


RBC  4.10 M/mm3 (3.65-5.03)   07/01/19  05:10    


 


Hgb  13.3 gm/dl (11.8-15.2)   07/01/19  05:10    


 


Hct  38.2 % (35.5-45.6)  D 07/01/19  05:10    


 


MCV  93 fl (84-94)   07/01/19  05:10    


 


MCH  33 pg (28-32)  H  07/01/19  05:10    


 


MCHC  35 % (32-34)  H  07/01/19  05:10    


 


RDW  13.7 % (13.2-15.2)   07/01/19  05:10    


 


Plt Count  161 K/mm3 (140-440)   07/01/19  05:10    


 


Lymph % (Auto)  31.5 % (13.4-35.0)   07/01/19  05:10    


 


Mono % (Auto)  8.6 % (0.0-7.3)  H  07/01/19  05:10    


 


Eos % (Auto)  0.5 % (0.0-4.3)   07/01/19  05:10    


 


Baso % (Auto)  0.2 % (0.0-1.8)   07/01/19  05:10    


 


Lymph #  2.2 K/mm3 (1.2-5.4)   07/01/19  05:10    


 


Mono #  0.6 K/mm3 (0.0-0.8)   07/01/19  05:10    


 


Eos #  0.0 K/mm3 (0.0-0.4)   07/01/19  05:10    


 


Baso #  0.0 K/mm3 (0.0-0.1)   07/01/19  05:10    


 


Seg Neutrophils %  59.2 % (40.0-70.0)   07/01/19  05:10    


 


Seg Neutrophils #  4.1 K/mm3 (1.8-7.7)   07/01/19  05:10    


 


POC ABG pH  7.339  (7.35-7.45)  L  07/01/19  12:52    


 


POC ABG pCO2  41.8  (35-45)   07/01/19  12:52    


 


POC ABG pO2  119  ()  H  07/01/19  12:52    


 


POC ABG HCO3  22.5  (22-26 mml/L)   07/01/19  12:52    


 


POC ABG Total CO2  24  (23-27mmol/L)   07/01/19  12:52    


 


POC ABG O2 Sat  98   07/01/19  12:52    


 


POC ABG Base Excess  -3  ((-2) - (+3)mmol/L)   07/01/19  12:52    


 


  28 %  07/01/19  12:52    


 


Sodium  142 mmol/L (137-145)   07/02/19  08:59    


 


Potassium  3.9 mmol/L (3.6-5.0)   07/02/19  08:59    


 


Chloride  107.2 mmol/L ()  H  07/02/19  08:59    


 


Carbon Dioxide  25 mmol/L (22-30)   07/02/19  08:59    


 


  14 mmol/L  07/02/19  08:59    


 


BUN  7 mg/dL (9-20)  L  07/02/19  08:59    


 


  1.0 mg/dL (0.8-1.5)   07/02/19  08:59    


 


Estimated GFR  > 60 ml/min  07/02/19  08:59    


 


  7 %  07/02/19  08:59    


 


Glucose  131 mg/dL ()  H  07/02/19  08:59    


 


Lactic Acid  0.90 mmol/L (0.7-2.0)   07/01/19  05:10    


 


Calcium  8.0 mg/dL (8.4-10.2)  L  07/02/19  08:59    


 


Magnesium  1.70 mg/dL (1.7-2.3)   07/02/19  08:59    


 


  0.80 mg/dL (0.1-1.2)   07/02/19  08:59    


 


AST  23 units/L (5-40)   07/02/19  08:59    


 


ALT  14 units/L (7-56)   07/02/19  08:59    


 


  55 units/L ()   07/02/19  08:59    


 


  6.1 g/dL (6.3-8.2)  L  07/02/19  08:59    


 


  3.2 g/dL (3.9-5)  L  07/02/19  08:59    


 


  1.1 %  07/02/19  08:59    


 


TSH  0.298 mlU/mL (0.270-4.200)   06/30/19  18:40    


 


  Yellow  (Yellow)   06/30/19  19:30    


 


  Slightly-cloudy  (Clear)   06/30/19  19:30    


 


  6.0  (5.0-7.0)   06/30/19  19:30    


 


Ur Specific Gravity  1.027  (1.003-1.030)   06/30/19  19:30    


 


  >500 mg/dL (Negative)   06/30/19  19:30    


 


  50 mg/dL (Negative)   06/30/19  19:30    


 


  20 mg/dL (Negative)   06/30/19  19:30    


 


  Neg  (Negative)   06/30/19  19:30    


 


  Neg  (Negative)   06/30/19  19:30    


 


  Neg  (Negative)   06/30/19  19:30    


 


  < 2.0 mg/dL (<2.0)   06/30/19  19:30    


 


Ur Leukocyte Esterase  Neg  (Negative)   06/30/19  19:30    


 


  13.0 /HPF (0.0-6.0)  H  06/30/19  19:30    


 


  11.0 /HPF (0.0-6.0)   06/30/19  19:30    


 


U Epithel Cells (Auto)  2.0 /HPF (0-13.0)   06/30/19  19:30    


 


  1+ /HPF (Negative)   06/30/19  19:30    


 


  1+ /HPF  06/30/19  19:30    


 


Salicylates  < 0.3 mg/dL (2.8-20.0)  L  06/30/19  18:40    


 


  Presumptive negative   06/30/19  19:30    


 


  Presumptive negative   06/30/19  19:30    


 


Acetaminophen  < 5.0 ug/mL (10.0-30.0)  L  06/30/19  18:40    


 


Ur Barbiturates Screen  Presumptive negative   06/30/19  19:30    


 


Ur Phencyclidine Scrn  Presumptive negative   06/30/19  19:30    


 


Ur Amphetamines Screen  Presumptive positive   06/30/19  19:30    


 


U Benzodiazepines Scrn  Presumptive negative   06/30/19  19:30    


 


  Presumptive positive   06/30/19  19:30    


 


U Marijuana (THC) Screen  Presumptive negative   06/30/19  19:30    


 


  Disclamer   06/30/19  19:30    


 


Plasma/Serum Alcohol  < 0.01 % (0-0.07)   06/30/19  18:40    














Active Medications





- Current Medications


Current Medications: 














Generic Name Dose Route Start Last Admin





  Trade Name Freq  PRN Reason Stop Dose Admin


 


Benzocaine/Menthol  1 each  07/01/19 14:32  07/01/19 22:09





  Cepacol X Strength  MM   1 each





  Q2H PRN   Administration





  Sore Throat  


 


Enoxaparin Sodium  40 mg  07/01/19 22:00  07/02/19 22:11





  Lovenox  SUB-Q   40 mg





  QDAY@2200 JOSE   Administration


 


Fluoxetine HCl  20 mg  07/03/19 10:30  07/03/19 10:24





  Prozac  PO   20 mg





  QDAY JOSE   Administration


 


Hydrophilic Ointment  1 applic  06/30/19 18:34 





  Vaseline Lip Therapy  TP  





  Q2HR PRN  





  Dry Lips  


 


Multi-Ingred Cream/Lotion/Oil/Oint  1 applic  06/30/19 18:34 





  Artificial Tears Ophth Oint  OU  





  Q4HR PRN  





  Dry Eye(s)  


 


Sodium Chloride  10 ml  06/30/19 22:00  07/03/19 10:26





  Sodium Chloride Flush Syringe 10 Ml  IV   10 ml





  BID JOSE   Administration


 


Sodium Chloride  10 ml  06/30/19 19:17 





  Sodium Chloride Flush Syringe 10 Ml  IV  





  PRN PRN  





  LINE FLUSH  














Nutrition/Malnutrition Assess





- Dietary Evaluation


Nutrition/Malnutrition Findings: 


                                        





Nutrition Notes                                            Start:  07/01/19 

14:28


Freq:                                                      Status: Active       




Protocol:                                                                       




 Document     07/01/19 14:28  RM  (Rec: 07/01/19 14:36  RM  [id number])


 Nutrition Notes


     Need for Assessment generated from:         MD Order


     Initial or Follow up                        Assessment


     Other Pertinent Diagnosis                   Depression, Bipolar,


                                                 Medication noncompliance,


                                                 Suicide attempt


     Current Diet                                NPO


     Labs/Tests                                  Reviewed


     Pertinent Medications                       Reviewed


     Height                                      5 ft 7 in


     Weight                                      81.2 kg


     Ideal Body Weight (kg)                      67.27


     BMI                                         28.0


     Subjective/Other Information                Consulted for evaluate


                                                 nutritional intake.


                                                 Pt on vent.


     Burn                                        Absent


     Trauma                                      Absent


     #1


      Nutrition Diagnosis                        Inadequate oral intake


      Etiology                                   on vent


      As Evidenced by Signs and Symptoms         NPO status


     Is patient on ventilator?                   Yes


     Is Patient Ambulatory and/or Out of Bed     No


     REE-(Stanly-St. Jeor-confined to bed)      [phone]


     Calculation Used for Recommendations        [facility]


     Additional Notes                            Protein Needs: 97-162g (1.2-2g


                                                 /kg)


                                                 Fluid Needs: 1 ml/kcal


 Nutrition Intervention


     Change Diet Order:                          TF consult


     Nutrition Support:                          Vital 1.2 at 75 ml/hr.


                                                 Water flush of 120 ml q 4 hrs.


     Kcal                                        2,160


     Protein (gm)                                135


     Fluid (mL)                                  1,460


     Goal #1                                     TF consult


     Anticipated Discharge Needs:                Unable to determine at this


                                                 time


     Follow-Up By:                               07/03/19


     Additional Comments                         Follow for TF consult

## 2019-07-03 NOTE — PROGRESS NOTE
Assessment and Plan








Acute respiratory failure on MVS secondary to drug overdose.


Drug overdose.


History of bipolar disorder.


Suicide Attempt


Mild hypokalemia.


Lactic acidosis at presentation





- prn oxygen supplementation


- prn analgesia


- prn antipsychotics


- 1 on 1 watch / sitter


- GI & VTE prophylaxis


- continue other car per attending / other consultants





.... re-evaluate in am & prn








Subjective


Date of service: 07/03/19


Principal diagnosis: Ac Resp failure s/p MVS; Drug OD; bipolar disorder; Suicide

Attempt


Interval history: 





Patient is seen today for: Acute respiratory failure on MVS secondary to drug 

overdose; Drug overdose; History of bipolar disorder; Suicide Attempt; Mild 

hypokalemia; Lactic acidosis at presentation





Seen and examined at bedside; 24hour events reviewed; nursing and respiratory 

care staff consulted; no adverse overnight events reported to me; doing better; 

resting peacefully in bed; No N/V/F/C





Objective


                               Vital Signs - 12hr











  07/03/19





  00:11


 


Temperature 98.7 F


 


Pulse Rate [ 48 L





Radial] 


 


Respiratory 18





Rate 


 


Blood Pressure 118/68


 


O2 Sat by Pulse 98





Oximetry 











Constitutional: no acute distress, alert


Eyes: non-icteric


ENT: oropharynx moist


Neck: supple, no lymphadenopathy, no JVD


Effort: normal


Ascultation: Bilateral: clear


Percussion: Bilateral: not dull


Cardiovascular: regular rate and rhythm


Gastrointestinal: normoactive bowel sounds, soft, non-tender, non-distended


Integumentary: normal


Extremities: no cyanosis, no edema, pulses normal, no ischemia or petechiae


Neurologic: normal mental status, non-focal exam, pupils equal and round, CN II-

XII normal, motor strength normal and


Psychiatric: mood appropriate, affect normal


CBC and BMP: 


                                 07/01/19 05:10





                                 07/02/19 08:59


ABG, PT/INR, D-dimer: 


ABG











POC ABG pH  7.339  (7.35-7.45)  L  07/01/19  12:52    


 


POC ABG pCO2  41.8  (35-45)   07/01/19  12:52    


 


POC ABG pO2  119  ()  H  07/01/19  12:52    


 


POC ABG HCO3  22.5  (22-26 mml/L)   07/01/19  12:52    


 


POC ABG Total CO2  24  (23-27mmol/L)   07/01/19  12:52    


 


POC ABG O2 Sat  98   07/01/19  12:52    








Abnormal lab findings: 


                                  Abnormal Labs











  06/30/19 06/30/19 06/30/19





  18:40 18:40 18:40


 


Hgb  15.7 H  


 


Hct  46.8 H  


 


MCV  96 H  


 


MCH   


 


MCHC   


 


Lymph % (Auto)  35.2 H  


 


Mono % (Auto)  11.7 H  


 


Mono #  0.9 H  


 


POC ABG pH   


 


POC ABG pCO2   


 


POC ABG pO2   


 


Potassium   


 


Chloride   


 


Carbon Dioxide   


 


BUN   


 


Glucose   


 


Lactic Acid   


 


Calcium   


 


Total Bilirubin   1.30 H 


 


AST   42 H 


 


Total Protein   


 


Albumin   


 


Urine WBC (Auto)   


 


Salicylates    < 0.3 L


 


Acetaminophen   














  06/30/19 06/30/19 06/30/19





  18:40 18:40 19:30


 


Hgb   


 


Hct   


 


MCV   


 


MCH   


 


MCHC   


 


Lymph % (Auto)   


 


Mono % (Auto)   


 


Mono #   


 


POC ABG pH   


 


POC ABG pCO2   


 


POC ABG pO2   


 


Potassium   


 


Chloride   


 


Carbon Dioxide   


 


BUN   


 


Glucose   


 


Lactic Acid   2.30 H* 


 


Calcium   


 


Total Bilirubin   


 


AST   


 


Total Protein   


 


Albumin   


 


Urine WBC (Auto)    13.0 H


 


Salicylates   


 


Acetaminophen  < 5.0 L  














  06/30/19 07/01/19 07/01/19





  20:20 05:01 05:10


 


Hgb   


 


Hct   


 


MCV   


 


MCH    33 H


 


MCHC    35 H


 


Lymph % (Auto)   


 


Mono % (Auto)    8.6 H


 


Mono #   


 


POC ABG pH   7.458 H 


 


POC ABG pCO2   31.3 L 


 


POC ABG pO2   216 H 


 


Potassium   


 


Chloride   


 


Carbon Dioxide   


 


BUN   


 


Glucose   


 


Lactic Acid  2.50 H*  


 


Calcium   


 


Total Bilirubin   


 


AST   


 


Total Protein   


 


Albumin   


 


Urine WBC (Auto)   


 


Salicylates   


 


Acetaminophen   














  07/01/19 07/01/19 07/02/19





  05:10 12:52 08:59


 


Hgb   


 


Hct   


 


MCV   


 


MCH   


 


MCHC   


 


Lymph % (Auto)   


 


Mono % (Auto)   


 


Mono #   


 


POC ABG pH   7.339 L 


 


POC ABG pCO2   


 


POC ABG pO2   119 H 


 


Potassium  3.5 L  


 


Chloride  109.1 H   107.2 H


 


Carbon Dioxide  20 L  


 


BUN    7 L


 


Glucose  64 L   131 H


 


Lactic Acid   


 


Calcium  8.2 L   8.0 L


 


Total Bilirubin   


 


AST   


 


Total Protein  5.7 L D   6.1 L


 


Albumin  3.5 L   3.2 L


 


Urine WBC (Auto)   


 


Salicylates   


 


Acetaminophen   











Allied health notes reviewed: nursing